# Patient Record
Sex: FEMALE | Race: BLACK OR AFRICAN AMERICAN | NOT HISPANIC OR LATINO | Employment: OTHER | URBAN - METROPOLITAN AREA
[De-identification: names, ages, dates, MRNs, and addresses within clinical notes are randomized per-mention and may not be internally consistent; named-entity substitution may affect disease eponyms.]

---

## 2023-12-05 ENCOUNTER — OCCUPATIONAL HEALTH (OUTPATIENT)
Dept: URGENT CARE | Facility: CLINIC | Age: 28
End: 2023-12-05

## 2023-12-05 VITALS — BODY MASS INDEX: 44.04 KG/M2 | WEIGHT: 280.63 LBS | HEIGHT: 67 IN

## 2023-12-05 DIAGNOSIS — Z00.00 ENCOUNTER FOR PHYSICAL EXAMINATION: Primary | ICD-10-CM

## 2023-12-05 LAB
BREATH ALCOHOL: 0
CTP QC/QA: YES
GLUCOSE SERPL-MCNC: 224 MG/DL (ref 70–110)
POC 10 PANEL DRUG SCREEN: NEGATIVE

## 2023-12-06 LAB
ALBUMIN SERPL-MCNC: 4.4 G/DL (ref 4–5)
ALBUMIN/GLOB SERPL: 1.2 {RATIO} (ref 1.2–2.2)
ALP SERPL-CCNC: 101 IU/L (ref 44–121)
ALT SERPL-CCNC: 23 IU/L (ref 0–32)
APPEARANCE UR: CLEAR
AST SERPL-CCNC: 16 IU/L (ref 0–40)
BACTERIA #/AREA URNS HPF: ABNORMAL /[HPF]
BASOPHILS # BLD AUTO: 0 X10E3/UL (ref 0–0.2)
BASOPHILS NFR BLD AUTO: 1 %
BILIRUB SERPL-MCNC: 0.3 MG/DL (ref 0–1.2)
BILIRUB UR QL STRIP: NEGATIVE
BUN SERPL-MCNC: 11 MG/DL (ref 6–20)
BUN/CREAT SERPL: 15 (ref 9–23)
CALCIUM SERPL-MCNC: 9.8 MG/DL (ref 8.7–10.2)
CASTS URNS QL MICRO: ABNORMAL /LPF
CHLORIDE SERPL-SCNC: 97 MMOL/L (ref 96–106)
CHOLEST SERPL-MCNC: 233 MG/DL (ref 100–199)
CO2 SERPL-SCNC: 25 MMOL/L (ref 20–29)
COLOR UR: YELLOW
CREAT SERPL-MCNC: 0.74 MG/DL (ref 0.57–1)
EOSINOPHIL # BLD AUTO: 0.3 X10E3/UL (ref 0–0.4)
EOSINOPHIL NFR BLD AUTO: 4 %
EPI CELLS #/AREA URNS HPF: >10 /HPF (ref 0–10)
ERYTHROCYTE [DISTWIDTH] IN BLOOD BY AUTOMATED COUNT: 14.3 % (ref 11.7–15.4)
EST. GFR  (NO RACE VARIABLE): 113 ML/MIN/1.73
GGT SERPL-CCNC: 11 IU/L (ref 0–60)
GLOBULIN SER CALC-MCNC: 3.8 G/DL (ref 1.5–4.5)
GLUCOSE SERPL-MCNC: 231 MG/DL (ref 70–99)
GLUCOSE UR QL STRIP: ABNORMAL
HCT VFR BLD AUTO: 39.3 % (ref 34–46.6)
HDLC SERPL-MCNC: 37 MG/DL
HGB BLD-MCNC: 12.1 G/DL (ref 11.1–15.9)
HGB UR QL STRIP: NEGATIVE
HIV 1+2 AB+HIV1 P24 AG SERPL QL IA: NON REACTIVE
IMM GRANULOCYTES # BLD AUTO: 0 X10E3/UL (ref 0–0.1)
IMM GRANULOCYTES NFR BLD AUTO: 0 %
KETONES UR QL STRIP: ABNORMAL
LDLC SERPL CALC-MCNC: 178 MG/DL (ref 0–99)
LEUKOCYTE ESTERASE UR QL STRIP: NEGATIVE
LYMPHOCYTES # BLD AUTO: 2.4 X10E3/UL (ref 0.7–3.1)
LYMPHOCYTES NFR BLD AUTO: 37 %
MCH RBC QN AUTO: 24.7 PG (ref 26.6–33)
MCHC RBC AUTO-ENTMCNC: 30.8 G/DL (ref 31.5–35.7)
MCV RBC AUTO: 80 FL (ref 79–97)
MICRO URNS: ABNORMAL
MONOCYTES # BLD AUTO: 0.4 X10E3/UL (ref 0.1–0.9)
MONOCYTES NFR BLD AUTO: 6 %
NEUTROPHILS # BLD AUTO: 3.4 X10E3/UL (ref 1.4–7)
NEUTROPHILS NFR BLD AUTO: 52 %
NITRITE UR QL STRIP: NEGATIVE
PH UR STRIP: 6.5 [PH] (ref 5–7.5)
PLATELET # BLD AUTO: 360 X10E3/UL (ref 150–450)
POTASSIUM SERPL-SCNC: 4.6 MMOL/L (ref 3.5–5.2)
PROT SERPL-MCNC: 8.2 G/DL (ref 6–8.5)
PROT UR QL STRIP: ABNORMAL
RBC # BLD AUTO: 4.89 X10E6/UL (ref 3.77–5.28)
RBC #/AREA URNS HPF: ABNORMAL /HPF (ref 0–2)
RPR SER QL: NON REACTIVE
SODIUM SERPL-SCNC: 134 MMOL/L (ref 134–144)
SP GR UR STRIP: >=1.03 (ref 1–1.03)
TRIGL SERPL-MCNC: 102 MG/DL (ref 0–149)
UROBILINOGEN UR STRIP-MCNC: 1 MG/DL (ref 0.2–1)
VLDLC SERPL CALC-MCNC: 18 MG/DL (ref 5–40)
WBC # BLD AUTO: 6.6 X10E3/UL (ref 3.4–10.8)
WBC #/AREA URNS HPF: ABNORMAL /HPF (ref 0–5)

## 2024-07-09 ENCOUNTER — OFFICE VISIT (OUTPATIENT)
Dept: OBSTETRICS AND GYNECOLOGY | Facility: CLINIC | Age: 29
End: 2024-07-09
Payer: COMMERCIAL

## 2024-07-09 VITALS
SYSTOLIC BLOOD PRESSURE: 122 MMHG | HEIGHT: 67 IN | BODY MASS INDEX: 42.46 KG/M2 | WEIGHT: 270.5 LBS | DIASTOLIC BLOOD PRESSURE: 74 MMHG

## 2024-07-09 DIAGNOSIS — Z12.4 SCREENING FOR CERVICAL CANCER: Primary | ICD-10-CM

## 2024-07-09 DIAGNOSIS — L73.2 HIDRADENITIS SUPPURATIVA: ICD-10-CM

## 2024-07-09 DIAGNOSIS — Z11.3 SCREENING EXAMINATION FOR STI: ICD-10-CM

## 2024-07-09 PROCEDURE — 87591 N.GONORRHOEAE DNA AMP PROB: CPT | Performed by: STUDENT IN AN ORGANIZED HEALTH CARE EDUCATION/TRAINING PROGRAM

## 2024-07-09 PROCEDURE — 88175 CYTOPATH C/V AUTO FLUID REDO: CPT | Performed by: STUDENT IN AN ORGANIZED HEALTH CARE EDUCATION/TRAINING PROGRAM

## 2024-07-09 PROCEDURE — 99999 PR PBB SHADOW E&M-EST. PATIENT-LVL III: CPT | Mod: PBBFAC,,, | Performed by: STUDENT IN AN ORGANIZED HEALTH CARE EDUCATION/TRAINING PROGRAM

## 2024-07-09 PROCEDURE — 87491 CHLMYD TRACH DNA AMP PROBE: CPT | Performed by: STUDENT IN AN ORGANIZED HEALTH CARE EDUCATION/TRAINING PROGRAM

## 2024-07-09 RX ORDER — CLINDAMYCIN HYDROCHLORIDE 300 MG/1
300 CAPSULE ORAL EVERY 6 HOURS
Qty: 56 CAPSULE | Refills: 0 | Status: SHIPPED | OUTPATIENT
Start: 2024-07-09 | End: 2024-07-23

## 2024-07-09 RX ORDER — METFORMIN HYDROCHLORIDE 500 MG/1
500 TABLET ORAL 2 TIMES DAILY WITH MEALS
COMMUNITY

## 2024-07-09 NOTE — PROGRESS NOTES
History & Physical  Gynecology      SUBJECTIVE:     Chief Complaint: Establish Care and Well Woman       History of Present Illness:    Here today for annual. Doing well.     Gyn: regular light periods, not currently sexually active but when is uses condoms. No hx STI. No hx of abnormal pap. No immediate FH of gyn or colon cancer    No tobacco    Going to [facility] this summer.    Has questions about boils.   Review of patient's allergies indicates:  No Known Allergies    Past Medical History:   Diagnosis Date    Factor VII deficiency      Past Surgical History:   Procedure Laterality Date    HIP SURGERY Left     KNEE SURGERY Left     MOUTH SURGERY Bilateral     MOUTH SURGERY      TONSILLECTOMY AND ADENOIDECTOMY       OB History          0    Para   0    Term   0       0    AB   0    Living   0         SAB   0    IAB   0    Ectopic   0    Multiple   0    Live Births   0               Family History   Problem Relation Name Age of Onset    Breast cancer Neg Hx      Colon cancer Neg Hx      Ovarian cancer Neg Hx      Uterine cancer Neg Hx      Cervical cancer Neg Hx       Social History     Tobacco Use    Smoking status: Never    Smokeless tobacco: Never   Substance Use Topics    Alcohol use: Yes     Comment: occ/rarely    Drug use: Never       Current Outpatient Medications   Medication Sig    metFORMIN (GLUCOPHAGE) 500 MG tablet Take 500 mg by mouth 2 (two) times daily with meals.    clindamycin (CLEOCIN) 300 MG capsule Take 1 capsule (300 mg total) by mouth every 6 (six) hours. for 14 days    coagulation factor VIIa,recomb (NOVOSEVEN IV) Inject into the vein.     No current facility-administered medications for this visit.         Review of Systems:  Review of Systems   Constitutional:  Negative for chills, fatigue and fever.   HENT:  Negative for congestion.    Eyes:  Negative for visual disturbance.   Respiratory:  Negative for cough and shortness of breath.    Cardiovascular:  Negative for chest  pain and palpitations.   Gastrointestinal:  Negative for abdominal distention, abdominal pain, constipation, diarrhea, nausea and vomiting.   Genitourinary:  Negative for difficulty urinating, dysuria, hematuria, vaginal bleeding and vaginal discharge.   Skin:  Negative for rash.   Neurological:  Negative for dizziness, seizures, light-headedness and headaches.   Hematological:  Does not bruise/bleed easily.   Psychiatric/Behavioral:  Negative for dysphoric mood. The patient is not nervous/anxious.         OBJECTIVE:     Physical Exam:  Physical Exam  Vitals reviewed.   Constitutional:       General: She is not in acute distress.     Appearance: Normal appearance. She is well-developed.   HENT:      Head: Normocephalic and atraumatic.   Cardiovascular:      Rate and Rhythm: Normal rate and regular rhythm.   Pulmonary:      Effort: Pulmonary effort is normal.   Chest:   Breasts:     Right: No inverted nipple, mass, nipple discharge, skin change or tenderness.      Left: No inverted nipple, mass, nipple discharge, skin change or tenderness.          Comments: Boils consistent with HS  Abdominal:      General: There is no distension.      Palpations: Abdomen is soft.      Tenderness: There is no abdominal tenderness.   Genitourinary:     Vagina: Normal.          Comments: Normal external female genitalia, normal hair distribution. Vaginal mucosa pink, moist, well rugated, scant white physiologic discharge. No blood in vault. Cervix pink, non-friable, without lesion. No CMT. Uterus non tender, mobile, not enlarged. Adnexa without fullness or tenderness.      ++ pelvic floor tension     ++ boils consistent with HS  Skin:     General: Skin is warm.   Neurological:      Mental Status: She is alert and oriented to person, place, and time.   Psychiatric:         Behavior: Behavior normal.         Thought Content: Thought content normal.         Judgment: Judgment normal.           ASSESSMENT:       ICD-10-CM ICD-9-CM    1.  Screening for cervical cancer  Z12.4 V76.2 Liquid-Based Pap Smear, Screening      2. Screening examination for STI  Z11.3 V74.5       3. Hidradenitis suppurativa  L73.2 705.83 clindamycin (CLEOCIN) 300 MG capsule      Ambulatory referral/consult to Dermatology          Orders Placed This Encounter   Procedures    Ambulatory referral/consult to Dermatology     Standing Status:   Future     Standing Expiration Date:   8/9/2025     Referral Priority:   Routine     Referral Type:   Consultation     Referral Reason:   Specialty Services Required     Requested Specialty:   Dermatology     Number of Visits Requested:   1           Plan:      Well woman:  - Pap today   - tobacco cessation n/a  - contraception declines  - HPV vaccine s/p  - Safe Sex screening GC/CT  - Obesity: Patient was counseled today regarding her diagnosis of obesity. We discussed her BMI and that normal BMI should be less than 25. We discussed the role obesity plays in her overall health, including increased risk for or worsening of diabetes, hypertension and cardiovascular disease. I recommend starting with a goal of losing 5% of her body weight. We discussed strategies for weight loss including appropriate portion sizes of healthier food choices such as grilled or baked protein sources, increasing fruits and vegetables while decreasing carbohydrate intake. Also discussed increasing physical activity in daily routine, such as taking stairs instead of elevators, parking at the back of the parking lot and walking into stores, and exercise when able.  Informational handouts on diet, exercise and obesity were given to the patient.   - Counseled to take daily multivitamin. If patient is of reproductive age and not on contraception, to take prenatal vitamin. Patient has been counseled on the vitamin D and calcium requirements per ACOG recommendations.  Age   Calcium(mg/day)   Vitamin D (IU/day)  9-18    1300                       600  19-50  1000                        600  51-70  1200                       600  >70      1,200                      800  Patient to start daily vitamin   - Covid vaccine n/a  - Discussed IPV, feels safe     CC: Boils  HPI: has always had boils under arm pits and groin. + pain, + drain.  A/P:  - consistent with HS  - cleocin called in  - lifestyle management including weight loss, improving possible insulin resistance  - referral to dermatology    Janeth Gregory M.D.  Obstetrics and Gynecology

## 2024-07-11 LAB
CLINICAL INFO: NORMAL
DATE OF PREVIOUS PAP: NORMAL
DATE PREVIOUS BX: NO
LMP START DATE: NORMAL
SPECIMEN SOURCE CVX/VAG CYTO: NORMAL

## 2024-07-13 LAB
C TRACH DNA SPEC QL NAA+PROBE: NOT DETECTED
N GONORRHOEA DNA SPEC QL NAA+PROBE: NOT DETECTED

## 2024-07-22 ENCOUNTER — OFFICE VISIT (OUTPATIENT)
Dept: FAMILY MEDICINE | Facility: CLINIC | Age: 29
End: 2024-07-22
Payer: COMMERCIAL

## 2024-07-22 ENCOUNTER — TELEPHONE (OUTPATIENT)
Dept: HEMATOLOGY/ONCOLOGY | Facility: CLINIC | Age: 29
End: 2024-07-22
Payer: COMMERCIAL

## 2024-07-22 VITALS
WEIGHT: 274.25 LBS | BODY MASS INDEX: 43.04 KG/M2 | DIASTOLIC BLOOD PRESSURE: 82 MMHG | HEART RATE: 92 BPM | RESPIRATION RATE: 18 BRPM | SYSTOLIC BLOOD PRESSURE: 134 MMHG | HEIGHT: 67 IN

## 2024-07-22 DIAGNOSIS — D68.2 FACTOR VII DEFICIENCY: ICD-10-CM

## 2024-07-22 DIAGNOSIS — E11.69 DM TYPE 2 WITH DIABETIC MIXED HYPERLIPIDEMIA: ICD-10-CM

## 2024-07-22 DIAGNOSIS — Z11.59 NEED FOR HEPATITIS C SCREENING TEST: ICD-10-CM

## 2024-07-22 DIAGNOSIS — R79.89 ABNORMAL CBC: ICD-10-CM

## 2024-07-22 DIAGNOSIS — E78.2 DM TYPE 2 WITH DIABETIC MIXED HYPERLIPIDEMIA: ICD-10-CM

## 2024-07-22 DIAGNOSIS — E66.01 MORBID OBESITY WITH BMI OF 40.0-44.9, ADULT: ICD-10-CM

## 2024-07-22 DIAGNOSIS — R53.83 FATIGUE, UNSPECIFIED TYPE: ICD-10-CM

## 2024-07-22 DIAGNOSIS — Z00.00 WELLNESS EXAMINATION: Primary | ICD-10-CM

## 2024-07-22 PROBLEM — R73.09 ELEVATED GLUCOSE LEVEL: Status: ACTIVE | Noted: 2023-12-11

## 2024-07-22 PROBLEM — N92.0 MENORRHAGIA: Status: ACTIVE | Noted: 2018-05-17

## 2024-07-22 PROCEDURE — 3075F SYST BP GE 130 - 139MM HG: CPT | Mod: CPTII,S$GLB,, | Performed by: INTERNAL MEDICINE

## 2024-07-22 PROCEDURE — 1159F MED LIST DOCD IN RCRD: CPT | Mod: CPTII,S$GLB,, | Performed by: INTERNAL MEDICINE

## 2024-07-22 PROCEDURE — 99385 PREV VISIT NEW AGE 18-39: CPT | Mod: S$GLB,,, | Performed by: INTERNAL MEDICINE

## 2024-07-22 PROCEDURE — 1160F RVW MEDS BY RX/DR IN RCRD: CPT | Mod: CPTII,S$GLB,, | Performed by: INTERNAL MEDICINE

## 2024-07-22 PROCEDURE — 3008F BODY MASS INDEX DOCD: CPT | Mod: CPTII,S$GLB,, | Performed by: INTERNAL MEDICINE

## 2024-07-22 PROCEDURE — 99999 PR PBB SHADOW E&M-EST. PATIENT-LVL IV: CPT | Mod: PBBFAC,,, | Performed by: INTERNAL MEDICINE

## 2024-07-22 PROCEDURE — 3079F DIAST BP 80-89 MM HG: CPT | Mod: CPTII,S$GLB,, | Performed by: INTERNAL MEDICINE

## 2024-07-22 RX ORDER — METFORMIN HYDROCHLORIDE 500 MG/1
500 TABLET, EXTENDED RELEASE ORAL
Qty: 30 TABLET | Refills: 2 | Status: SHIPPED | OUTPATIENT
Start: 2024-07-22 | End: 2025-07-22

## 2024-07-22 NOTE — PROGRESS NOTES
Subjective:       Patient ID: Lenka Barboza is a 28 y.o. female.    Chief Complaint: Establish Care   HPI    Gyn: Dr Foreman   Immunizations: Flu: Tdap: booster recommend.  Prevnar 20: next visit   Smoker: never     New to clinic  Establish care  Moved back 1 yr [place]  Works at 's office    Wellness   Due labs     Type 2 diabetes:  Diagnosed early 20's.  Not compliant with metformin due to excessive diarrhea with use.  Bottle Rx once daily.  Last time had labs done [facility].  Not sure previous A1c.  Occasional check sugars not feeling well will be in the 200.  Drink sweet tea working on cutting down.   Wants to see of Ozempic is covered.  Rx not consistent with taking Metformin 500 once daily (SE diarreha)   A1c 2018 6.6     Factor 7 deficiency:  Previous severe menorrhagia.  Manage OCP.   Mgmt  needs new Heme MD.  Avoids NSAIDs.  Developed allergy to Tylenol.    Previously aged out [facility].Eren Villanueva MD.   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  Plan:  1. She should continue her oral contraceptives  2. If her heavy menses continues with her OCPs, we will plan to add Lysteda during her cycle  3. For moderate to major bleeding episodes she will be treated with antifibrinolytics and rFVIIa @ 20-30mcg/kg/dose  4. She has iron deficiency without anemia and I will recommend a daily MVI with iron    Mixed HLD: LDL goal < 70 // not on statin. Get new labs    LDL highest 178      Metabolic syndrome: BMI 43/274.  Refer to diabetic education    ____________________________________________________________________________________________________  Assessment & Plan:  1. Wellness examination  - Comprehensive Metabolic Panel; Future  - Hemoglobin A1C; Future  - Lipid Panel; Future  - Microalbumin/Creatinine Ratio, Urine; Future  - Iron and TIBC; Future  - CBC Without  Differential; Future  - TSH; Future  - Hepatitis C Antibody; Future    2. Factor VII deficiency  - Ambulatory referral/consult to Hematology / Oncology; Future    3. DM type 2 with diabetic mixed hyperlipidemia  - Comprehensive Metabolic Panel; Future  - Hemoglobin A1C; Future  - Lipid Panel; Future  - Microalbumin/Creatinine Ratio, Urine; Future  - metFORMIN (GLUCOPHAGE-XR) 500 MG ER 24hr tablet; Take 1 tablet (500 mg total) by mouth daily with breakfast.  Dispense: 30 tablet; Refill: 2    4. Fatigue, unspecified type  - TSH; Future    5. Abnormal CBC  - Iron and TIBC; Future  - CBC Without Differential; Future    6. Need for hepatitis C screening test  - Hepatitis C Antibody; Future    7. Morbid obesity with BMI of 40.0-44.9, adult     Wellness examination  -     Comprehensive Metabolic Panel; Future; Expected date: 07/22/2024  -     Hemoglobin A1C; Future; Expected date: 07/22/2024  -     Lipid Panel; Future; Expected date: 07/22/2024  -     Microalbumin/Creatinine Ratio, Urine; Future; Expected date: 07/22/2024  -     Iron and TIBC; Future; Expected date: 07/22/2024  -     CBC Without Differential; Future; Expected date: 07/22/2024  -     TSH; Future; Expected date: 07/22/2024  -     Hepatitis C Antibody; Future; Expected date: 07/22/2024    Factor VII deficiency  -     Ambulatory referral/consult to Hematology / Oncology; Future; Expected date: 07/29/2024    DM type 2 with diabetic mixed hyperlipidemia  -     Comprehensive Metabolic Panel; Future; Expected date: 07/22/2024  -     Hemoglobin A1C; Future; Expected date: 07/22/2024  -     Lipid Panel; Future; Expected date: 07/22/2024  -     Microalbumin/Creatinine Ratio, Urine; Future; Expected date: 07/22/2024  -     metFORMIN (GLUCOPHAGE-XR) 500 MG ER 24hr tablet; Take 1 tablet (500 mg total) by mouth daily with breakfast.  Dispense: 30 tablet; Refill: 2    Fatigue, unspecified type  -     TSH; Future; Expected date: 07/22/2024    Abnormal CBC  -     Iron and  "TIBC; Future; Expected date: 07/22/2024  -     CBC Without Differential; Future; Expected date: 07/22/2024    Need for hepatitis C screening test  -     Hepatitis C Antibody; Future; Expected date: 07/22/2024    Morbid obesity with BMI of 40.0-44.9, adult        Continue to work on regular exercise, maintain healthy weight, balanced diet. Avoid unhealthy habits: smoking, excessive alcohol intake.     Disclaimer: This note was partly generated using dictation software which may occasionally result in transcription errors  ____________________________________________________________________________________________________  Review of Systems:  Review of Systems   Constitutional:  Negative for appetite change.   HENT:  Negative for nosebleeds.    Eyes:  Negative for pain.   Respiratory:  Negative for choking.    Cardiovascular:  Negative for chest pain.   Gastrointestinal:  Negative for blood in stool.   Genitourinary:  Negative for hematuria.   Musculoskeletal:  Negative for joint swelling.   Skin:  Negative for pallor.   Neurological:  Negative for facial asymmetry.   Hematological:  Does not bruise/bleed easily.   Psychiatric/Behavioral:  Negative for confusion.        Objective:     Wt Readings from Last 3 Encounters:   07/22/24 124.4 kg (274 lb 4 oz)   07/09/24 122.7 kg (270 lb 8.1 oz)   12/05/23 127.3 kg (280 lb 10.3 oz)     BP Readings from Last 3 Encounters:   07/22/24 134/82   07/09/24 122/74       Lab Results   Component Value Date    WBC 6.6 12/05/2023    HGB 12.1 12/05/2023    HCT 39.3 12/05/2023     12/05/2023     12/05/2023    K 4.6 12/05/2023    CL 97 12/05/2023    ALT 23 12/05/2023    AST 16 12/05/2023    CO2 25 12/05/2023    CREATININE 0.74 12/05/2023    BUN 11 12/05/2023     (H) 12/05/2023      No results found for: "HGBA1C"   No results found for: "TSH"  No results found for: "FREET4"  Lab Results   Component Value Date    LDLCALC 178 (H) 12/05/2023     Lab Results   Component " Value Date    TRIG 102 12/05/2023            Physical Exam  Constitutional:       Appearance: Normal appearance.   HENT:      Head: Normocephalic and atraumatic.   Eyes:      Extraocular Movements: Extraocular movements intact.      Pupils: Pupils are equal, round, and reactive to light.   Cardiovascular:      Rate and Rhythm: Normal rate.   Pulmonary:      Effort: Pulmonary effort is normal.   Neurological:      Mental Status: She is alert and oriented to person, place, and time.   Psychiatric:         Mood and Affect: Mood normal.         Medication List with Changes/Refills   New Medications    METFORMIN (GLUCOPHAGE-XR) 500 MG ER 24HR TABLET    Take 1 tablet (500 mg total) by mouth daily with breakfast.   Current Medications    CLINDAMYCIN (CLEOCIN) 300 MG CAPSULE    Take 1 capsule (300 mg total) by mouth every 6 (six) hours. for 14 days    COAGULATION FACTOR VIIA,RECOMB (NOVOSEVEN IV)    Inject into the vein.   Discontinued Medications    METFORMIN (GLUCOPHAGE) 500 MG TABLET    Take 500 mg by mouth 2 (two) times daily with meals.

## 2024-07-22 NOTE — TELEPHONE ENCOUNTER
Hematology referral in [place]. Spoke with pt and scheduled first available. Pt accepted appt and VU of date/time/location.

## 2024-08-05 ENCOUNTER — LAB VISIT (OUTPATIENT)
Dept: LAB | Facility: HOSPITAL | Age: 29
End: 2024-08-05
Attending: INTERNAL MEDICINE
Payer: COMMERCIAL

## 2024-08-05 DIAGNOSIS — E78.2 DM TYPE 2 WITH DIABETIC MIXED HYPERLIPIDEMIA: ICD-10-CM

## 2024-08-05 DIAGNOSIS — Z00.00 WELLNESS EXAMINATION: ICD-10-CM

## 2024-08-05 DIAGNOSIS — R53.83 FATIGUE, UNSPECIFIED TYPE: ICD-10-CM

## 2024-08-05 DIAGNOSIS — Z11.59 NEED FOR HEPATITIS C SCREENING TEST: ICD-10-CM

## 2024-08-05 DIAGNOSIS — E11.69 DM TYPE 2 WITH DIABETIC MIXED HYPERLIPIDEMIA: ICD-10-CM

## 2024-08-05 DIAGNOSIS — R79.89 ABNORMAL CBC: ICD-10-CM

## 2024-08-05 LAB
ALBUMIN SERPL BCP-MCNC: 3.8 G/DL (ref 3.5–5.2)
ALBUMIN/CREAT UR: NORMAL UG/MG (ref 0–30)
ALP SERPL-CCNC: 80 U/L (ref 55–135)
ALT SERPL W/O P-5'-P-CCNC: 16 U/L (ref 10–44)
ANION GAP SERPL CALC-SCNC: 6 MMOL/L (ref 8–16)
AST SERPL-CCNC: 10 U/L (ref 10–40)
BILIRUB SERPL-MCNC: 0.3 MG/DL (ref 0.1–1)
BUN SERPL-MCNC: 14 MG/DL (ref 6–20)
CALCIUM SERPL-MCNC: 10 MG/DL (ref 8.7–10.5)
CHLORIDE SERPL-SCNC: 102 MMOL/L (ref 95–110)
CHOLEST SERPL-MCNC: 225 MG/DL (ref 120–199)
CHOLEST/HDLC SERPL: 6.3 {RATIO} (ref 2–5)
CO2 SERPL-SCNC: 26 MMOL/L (ref 23–29)
CREAT SERPL-MCNC: 0.8 MG/DL (ref 0.5–1.4)
CREAT UR-MCNC: 45 MG/DL (ref 15–325)
ERYTHROCYTE [DISTWIDTH] IN BLOOD BY AUTOMATED COUNT: 14.9 % (ref 11.5–14.5)
EST. GFR  (NO RACE VARIABLE): >60 ML/MIN/1.73 M^2
ESTIMATED AVG GLUCOSE: 255 MG/DL (ref 68–131)
GLUCOSE SERPL-MCNC: 297 MG/DL (ref 70–110)
HBA1C MFR BLD: 10.5 % (ref 4–5.6)
HCT VFR BLD AUTO: 40.7 % (ref 37–48.5)
HCV AB SERPL QL IA: NORMAL
HDLC SERPL-MCNC: 36 MG/DL (ref 40–75)
HDLC SERPL: 16 % (ref 20–50)
HGB BLD-MCNC: 12.1 G/DL (ref 12–16)
IRON SERPL-MCNC: 40 UG/DL (ref 30–160)
LDLC SERPL CALC-MCNC: 164.6 MG/DL (ref 63–159)
MCH RBC QN AUTO: 23.8 PG (ref 27–31)
MCHC RBC AUTO-ENTMCNC: 29.7 G/DL (ref 32–36)
MCV RBC AUTO: 80 FL (ref 82–98)
MICROALBUMIN UR DL<=1MG/L-MCNC: <5 UG/ML
NONHDLC SERPL-MCNC: 189 MG/DL
PLATELET # BLD AUTO: 330 K/UL (ref 150–450)
PMV BLD AUTO: 11.3 FL (ref 9.2–12.9)
POTASSIUM SERPL-SCNC: 4.7 MMOL/L (ref 3.5–5.1)
PROT SERPL-MCNC: 7.8 G/DL (ref 6–8.4)
RBC # BLD AUTO: 5.08 M/UL (ref 4–5.4)
SATURATED IRON: 14 % (ref 20–50)
SODIUM SERPL-SCNC: 134 MMOL/L (ref 136–145)
TOTAL IRON BINDING CAPACITY: 283 UG/DL (ref 250–450)
TRANSFERRIN SERPL-MCNC: 191 MG/DL (ref 200–375)
TRIGL SERPL-MCNC: 122 MG/DL (ref 30–150)
TSH SERPL DL<=0.005 MIU/L-ACNC: 1.82 UIU/ML (ref 0.4–4)
WBC # BLD AUTO: 7.12 K/UL (ref 3.9–12.7)

## 2024-08-05 PROCEDURE — 84443 ASSAY THYROID STIM HORMONE: CPT | Performed by: INTERNAL MEDICINE

## 2024-08-05 PROCEDURE — 82570 ASSAY OF URINE CREATININE: CPT | Performed by: INTERNAL MEDICINE

## 2024-08-05 PROCEDURE — 84466 ASSAY OF TRANSFERRIN: CPT | Performed by: INTERNAL MEDICINE

## 2024-08-05 PROCEDURE — 80053 COMPREHEN METABOLIC PANEL: CPT | Performed by: INTERNAL MEDICINE

## 2024-08-05 PROCEDURE — 83540 ASSAY OF IRON: CPT | Performed by: INTERNAL MEDICINE

## 2024-08-05 PROCEDURE — 85027 COMPLETE CBC AUTOMATED: CPT | Performed by: INTERNAL MEDICINE

## 2024-08-05 PROCEDURE — 80061 LIPID PANEL: CPT | Performed by: INTERNAL MEDICINE

## 2024-08-05 PROCEDURE — 86803 HEPATITIS C AB TEST: CPT | Performed by: INTERNAL MEDICINE

## 2024-08-05 PROCEDURE — 36415 COLL VENOUS BLD VENIPUNCTURE: CPT | Mod: PO | Performed by: INTERNAL MEDICINE

## 2024-08-05 PROCEDURE — 83036 HEMOGLOBIN GLYCOSYLATED A1C: CPT | Performed by: INTERNAL MEDICINE

## 2024-08-06 DIAGNOSIS — Z00.00 WELLNESS EXAMINATION: ICD-10-CM

## 2024-08-06 DIAGNOSIS — E11.69 DM TYPE 2 WITH DIABETIC MIXED HYPERLIPIDEMIA: Primary | ICD-10-CM

## 2024-08-06 DIAGNOSIS — E78.2 DM TYPE 2 WITH DIABETIC MIXED HYPERLIPIDEMIA: Primary | ICD-10-CM

## 2024-08-06 RX ORDER — ROSUVASTATIN CALCIUM 5 MG/1
5 TABLET, COATED ORAL DAILY
Qty: 90 TABLET | Refills: 1 | Status: SHIPPED | OUTPATIENT
Start: 2024-08-06 | End: 2025-08-06

## 2024-08-06 RX ORDER — SEMAGLUTIDE 0.68 MG/ML
INJECTION, SOLUTION SUBCUTANEOUS
Qty: 3 ML | Refills: 3 | Status: SHIPPED | OUTPATIENT
Start: 2024-08-06

## 2024-09-10 ENCOUNTER — TELEPHONE (OUTPATIENT)
Dept: HEMATOLOGY/ONCOLOGY | Facility: CLINIC | Age: 29
End: 2024-09-10
Payer: COMMERCIAL

## 2024-09-11 DIAGNOSIS — E78.2 DM TYPE 2 WITH DIABETIC MIXED HYPERLIPIDEMIA: ICD-10-CM

## 2024-09-11 DIAGNOSIS — E11.69 DM TYPE 2 WITH DIABETIC MIXED HYPERLIPIDEMIA: ICD-10-CM

## 2024-09-12 RX ORDER — SEMAGLUTIDE 0.68 MG/ML
0.5 INJECTION, SOLUTION SUBCUTANEOUS
Qty: 3 ML | Refills: 1 | Status: SHIPPED | OUTPATIENT
Start: 2024-09-12

## 2024-09-12 NOTE — TELEPHONE ENCOUNTER
Refill Routing Note   Medication(s) are not appropriate for processing by [facility] for the following reason(s):        New or recently adjusted medication    [facility] action(s):  Defer             Appointments  past 12m or future 3m with PCP    Date Provider   Last Visit   7/22/2024 Du Roldan MD   Next Visit   11/21/2024 Du Roldan MD   ED visits in past 90 days: 0        Note composed:11:44 AM 09/12/2024

## 2024-09-30 ENCOUNTER — LAB VISIT (OUTPATIENT)
Dept: LAB | Facility: HOSPITAL | Age: 29
End: 2024-09-30
Attending: INTERNAL MEDICINE
Payer: COMMERCIAL

## 2024-09-30 ENCOUNTER — OFFICE VISIT (OUTPATIENT)
Dept: HEMATOLOGY/ONCOLOGY | Facility: CLINIC | Age: 29
End: 2024-09-30
Payer: COMMERCIAL

## 2024-09-30 VITALS
BODY MASS INDEX: 41.42 KG/M2 | DIASTOLIC BLOOD PRESSURE: 95 MMHG | WEIGHT: 263.88 LBS | SYSTOLIC BLOOD PRESSURE: 153 MMHG | HEIGHT: 67 IN | OXYGEN SATURATION: 97 % | RESPIRATION RATE: 16 BRPM | TEMPERATURE: 98 F | HEART RATE: 100 BPM

## 2024-09-30 DIAGNOSIS — D68.2 FACTOR VII DEFICIENCY: ICD-10-CM

## 2024-09-30 DIAGNOSIS — N92.0 MENORRHAGIA WITH REGULAR CYCLE: Primary | ICD-10-CM

## 2024-09-30 LAB
ALBUMIN SERPL BCP-MCNC: 3.8 G/DL (ref 3.5–5.2)
ALP SERPL-CCNC: 85 U/L (ref 55–135)
ALT SERPL W/O P-5'-P-CCNC: 10 U/L (ref 10–44)
ANION GAP SERPL CALC-SCNC: 9 MMOL/L (ref 8–16)
APTT PPP: 36.4 SEC (ref 21–32)
AST SERPL-CCNC: 8 U/L (ref 10–40)
BASOPHILS # BLD AUTO: 0.01 K/UL (ref 0–0.2)
BASOPHILS NFR BLD: 0.2 % (ref 0–1.9)
BILIRUB SERPL-MCNC: 0.4 MG/DL (ref 0.1–1)
BUN SERPL-MCNC: 9 MG/DL (ref 6–20)
CALCIUM SERPL-MCNC: 9.7 MG/DL (ref 8.7–10.5)
CHLORIDE SERPL-SCNC: 99 MMOL/L (ref 95–110)
CO2 SERPL-SCNC: 25 MMOL/L (ref 23–29)
CREAT SERPL-MCNC: 0.9 MG/DL (ref 0.5–1.4)
DIFFERENTIAL METHOD BLD: ABNORMAL
EOSINOPHIL # BLD AUTO: 0.2 K/UL (ref 0–0.5)
EOSINOPHIL NFR BLD: 3.3 % (ref 0–8)
ERYTHROCYTE [DISTWIDTH] IN BLOOD BY AUTOMATED COUNT: 15.2 % (ref 11.5–14.5)
EST. GFR  (NO RACE VARIABLE): >60 ML/MIN/1.73 M^2
FIBRINOGEN PPP-MCNC: 325 MG/DL (ref 182–400)
GLUCOSE SERPL-MCNC: 350 MG/DL (ref 70–110)
HCT VFR BLD AUTO: 38.4 % (ref 37–48.5)
HGB BLD-MCNC: 12.1 G/DL (ref 12–16)
IMM GRANULOCYTES # BLD AUTO: 0.01 K/UL (ref 0–0.04)
IMM GRANULOCYTES NFR BLD AUTO: 0.2 % (ref 0–0.5)
INR PPP: 1.2 (ref 0.8–1.2)
LYMPHOCYTES # BLD AUTO: 1.6 K/UL (ref 1–4.8)
LYMPHOCYTES NFR BLD: 24.2 % (ref 18–48)
MCH RBC QN AUTO: 24.1 PG (ref 27–31)
MCHC RBC AUTO-ENTMCNC: 31.5 G/DL (ref 32–36)
MCV RBC AUTO: 77 FL (ref 82–98)
MONOCYTES # BLD AUTO: 0.4 K/UL (ref 0.3–1)
MONOCYTES NFR BLD: 6 % (ref 4–15)
NEUTROPHILS # BLD AUTO: 4.4 K/UL (ref 1.8–7.7)
NEUTROPHILS NFR BLD: 66.1 % (ref 38–73)
NRBC BLD-RTO: 0 /100 WBC
PLATELET # BLD AUTO: 317 K/UL (ref 150–450)
PMV BLD AUTO: 8.9 FL (ref 9.2–12.9)
POTASSIUM SERPL-SCNC: 4.2 MMOL/L (ref 3.5–5.1)
PROT SERPL-MCNC: 8.1 G/DL (ref 6–8.4)
PROTHROMBIN TIME: 12.9 SEC (ref 9–12.5)
RBC # BLD AUTO: 5.02 M/UL (ref 4–5.4)
SODIUM SERPL-SCNC: 133 MMOL/L (ref 136–145)
WBC # BLD AUTO: 6.64 K/UL (ref 3.9–12.7)

## 2024-09-30 PROCEDURE — 85610 PROTHROMBIN TIME: CPT | Performed by: INTERNAL MEDICINE

## 2024-09-30 PROCEDURE — 99205 OFFICE O/P NEW HI 60 MIN: CPT | Mod: S$GLB,,, | Performed by: INTERNAL MEDICINE

## 2024-09-30 PROCEDURE — 3046F HEMOGLOBIN A1C LEVEL >9.0%: CPT | Mod: CPTII,S$GLB,, | Performed by: INTERNAL MEDICINE

## 2024-09-30 PROCEDURE — 3008F BODY MASS INDEX DOCD: CPT | Mod: CPTII,S$GLB,, | Performed by: INTERNAL MEDICINE

## 2024-09-30 PROCEDURE — 1159F MED LIST DOCD IN RCRD: CPT | Mod: CPTII,S$GLB,, | Performed by: INTERNAL MEDICINE

## 2024-09-30 PROCEDURE — 85384 FIBRINOGEN ACTIVITY: CPT | Performed by: INTERNAL MEDICINE

## 2024-09-30 PROCEDURE — 85025 COMPLETE CBC W/AUTO DIFF WBC: CPT | Mod: PN | Performed by: INTERNAL MEDICINE

## 2024-09-30 PROCEDURE — 82728 ASSAY OF FERRITIN: CPT | Performed by: INTERNAL MEDICINE

## 2024-09-30 PROCEDURE — 85730 THROMBOPLASTIN TIME PARTIAL: CPT | Performed by: INTERNAL MEDICINE

## 2024-09-30 PROCEDURE — G2211 COMPLEX E/M VISIT ADD ON: HCPCS | Mod: S$GLB,,, | Performed by: INTERNAL MEDICINE

## 2024-09-30 PROCEDURE — 3080F DIAST BP >= 90 MM HG: CPT | Mod: CPTII,S$GLB,, | Performed by: INTERNAL MEDICINE

## 2024-09-30 PROCEDURE — 85230 CLOT FACTOR VII PROCONVERTIN: CPT | Performed by: INTERNAL MEDICINE

## 2024-09-30 PROCEDURE — 3077F SYST BP >= 140 MM HG: CPT | Mod: CPTII,S$GLB,, | Performed by: INTERNAL MEDICINE

## 2024-09-30 PROCEDURE — 80053 COMPREHEN METABOLIC PANEL: CPT | Mod: PN | Performed by: INTERNAL MEDICINE

## 2024-09-30 PROCEDURE — 99999 PR PBB SHADOW E&M-EST. PATIENT-LVL IV: CPT | Mod: PBBFAC,,, | Performed by: INTERNAL MEDICINE

## 2024-09-30 PROCEDURE — 85670 THROMBIN TIME PLASMA: CPT | Performed by: INTERNAL MEDICINE

## 2024-09-30 NOTE — PROGRESS NOTES
Subjective     Patient ID: Lenka Barboza is a 29 y.o. female.    Chief Complaint: New Patient (Factor VII deficiency / Jamar)    HPI  Mrs. Barboza is a 29-year-old female referred for factor 7 deficiency.  Apparently she had been diagnosed at [facility] as a young child.  She was subsequently followed at the [facility] and the recommendations so far were to treat her with recombinant factor 7 for moderate to major bleeding episodes.  In addition, she was intermittently treated with oral contraceptives and she has had iron deficiency anemia treated intermittently in the past.  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  As mentioned above, her initial diagnosis was at [facility].  As a young adult she was followed at [facility] but subsequently moved to [place], [place] and she recently moved back to the [place] area in August of 2023.  She has not seen a hematologist since her return to [place].    Most recent CBC within our system is from 08/05/2024 and it is as follows:  WBCs 7100 per cubic mm, hemoglobin 12.1 grams/deciliter, hematocrit 40.7%, MCV 80 and platelets 330 K    PAST MEDICAL HISTORY:  As above.  She also has a history of menorrhagias, intermittent iron deficiency anemia, and type 2 diabetes  PAST SURGICAL HISTORY:  Significant for tonsillectomy, teeth extractions, surgery in the left hip and in the left knee.  SOCIAL HISTORY:  She is single.  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  She does not smoke and does not drink alcohol more than socially  FAMILY HISTORY:  Her father had also had nosebleeds.  GYN HISTORY:  Prior history of menorrhagia, she had been managed with OCPs intermittently but she is not on any right now.  She states that her periods are every 28-30 days with the  menstrual flow being 3-4 days at a time and the 1st couple of days being heavier.    Review of Systems     Overall she feels well and she has absolutely no complaints today other than mild nausea.  ECOG PS is 1. She denies any anxiety, depression, easy bruising, fevers, chills, night  sweats, weight loss, vomiting, diarrhea, constipation, diplopia, blurred vision, headache, chest pain, palpitations, shortness of breath, breast pain, abdominal pain, extremity pain, or difficulty ambulating.  The remainder of the ten-point ROS, including general, skin, lymph, H/N, cardiorespiratory, GI, , Neuro, Endocrine, and psychiatric is negative.       Physical Exam    She is alert, oriented to time, place, person, pleasant, well      nourished, in no acute physical distress.                                    VITAL SIGNS:  Reviewed                                      HEENT:  Normal.  There are no nasal, oral, lip, gingival, auricular, lid,    or conjunctival lesions.  Mucosae are moist and pink, and there is no        thrush.  Pupils are equal, reactive to light and accommodation.              Extraocular muscle movements are intact.  Dentition is good.  There is no frontal or maxillary tenderness.                                     NECK:  Supple without JVD, adenopathy, or thyromegaly.                       LUNGS:  Clear to auscultation without wheezing, rales, or rhonchi.           CARDIOVASCULAR:  Reveals an S1, S2, no murmurs, no rubs, no gallops.         ABDOMEN:  Soft, nontender, without organomegaly.  Bowel sounds are    present.                                                                     EXTREMITIES:  No cyanosis, clubbing, or edema.                               BREASTS:  Deferred                                     LYMPHATIC:  There is no cervical, axillary, or supraclavicular adenopathy.   SKIN:  Warm and moist, without petechiae, rashes, induration, or ecchymoses.           NEUROLOGIC:  DTRs are 0-1+  bilaterally, symmetrical, motor function is 5/5,  and cranial nerves are  within normal limits.     ASSESSMENT  Factor 7 deficiency per history.  Patient was diagnosed due to intermittent but frequent epistaxis as a child.  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  We went over the implications her diagnosis.  I explained to her that there is no preemptive treatment, however, should she ever need a surgical procedure or sustain any injuries, no matter how minor, she should be admitted for factor 7 infusions which will need to be administered every 4 hours for several days postoperatively.  I advised her to schedule any surgical procedures at the [facility] rather than one of the local hospitals.  She voiced understanding.  At this point we will proceed as follows:  We will repeat her CBC today  We will check her ferritin.  If she does have iron deficiency anemia a prescription for oral iron supplements will be called into her pharmacy  We will check thrombin time a fibrinogen level given the questionable history of dysfibrinogenemia  We will check PT T and INR  Last but not least, we will check a factor 7 level.    Her multiple questions were answered to her satisfaction.  I spent approximately 60 minutes reviewing the available records and evaluating the patient, out of which over 50% of the time was spent face to face with the patient in counseling and coordinating this patient's care.  Visit today included increased complexity associated with the care of the factor 7 deficiency and possible disfibrinogenemia.Route Chart for Scheduling    Med Onc Chart Routing      Follow up with physician 3 weeks. labs today see me in 3-4 weeks to discuss herrera results   Follow up with CRISTINA    Infusion scheduling note    Injection scheduling note    Labs     Imaging    Pharmacy appointment    Other referrals

## 2024-10-01 LAB
FACT VII ACT/NOR PPP: 44 % (ref 65–155)
FERRITIN SERPL-MCNC: 99 NG/ML (ref 20–300)

## 2024-10-02 LAB — TT IMM BOVINE THROMBIN PPP: 16.8 SEC (ref 15.8–24.9)

## 2024-10-28 ENCOUNTER — OFFICE VISIT (OUTPATIENT)
Dept: HEMATOLOGY/ONCOLOGY | Facility: CLINIC | Age: 29
End: 2024-10-28
Payer: COMMERCIAL

## 2024-10-28 VITALS
SYSTOLIC BLOOD PRESSURE: 144 MMHG | DIASTOLIC BLOOD PRESSURE: 94 MMHG | HEIGHT: 67 IN | RESPIRATION RATE: 16 BRPM | HEART RATE: 95 BPM | BODY MASS INDEX: 41.84 KG/M2 | OXYGEN SATURATION: 95 % | WEIGHT: 266.56 LBS | TEMPERATURE: 98 F

## 2024-10-28 DIAGNOSIS — D68.2 FACTOR VII DEFICIENCY: Primary | ICD-10-CM

## 2024-10-28 PROCEDURE — 99999 PR PBB SHADOW E&M-EST. PATIENT-LVL III: CPT | Mod: PBBFAC,,, | Performed by: INTERNAL MEDICINE

## 2024-10-28 PROCEDURE — G2211 COMPLEX E/M VISIT ADD ON: HCPCS | Mod: S$GLB,,, | Performed by: INTERNAL MEDICINE

## 2024-10-28 PROCEDURE — 1159F MED LIST DOCD IN RCRD: CPT | Mod: CPTII,S$GLB,, | Performed by: INTERNAL MEDICINE

## 2024-10-28 PROCEDURE — 3008F BODY MASS INDEX DOCD: CPT | Mod: CPTII,S$GLB,, | Performed by: INTERNAL MEDICINE

## 2024-10-28 PROCEDURE — 99214 OFFICE O/P EST MOD 30 MIN: CPT | Mod: S$GLB,,, | Performed by: INTERNAL MEDICINE

## 2024-10-28 PROCEDURE — 3077F SYST BP >= 140 MM HG: CPT | Mod: CPTII,S$GLB,, | Performed by: INTERNAL MEDICINE

## 2024-10-28 PROCEDURE — 3046F HEMOGLOBIN A1C LEVEL >9.0%: CPT | Mod: CPTII,S$GLB,, | Performed by: INTERNAL MEDICINE

## 2024-10-28 PROCEDURE — 3080F DIAST BP >= 90 MM HG: CPT | Mod: CPTII,S$GLB,, | Performed by: INTERNAL MEDICINE

## 2024-11-19 ENCOUNTER — PATIENT MESSAGE (OUTPATIENT)
Dept: RESEARCH | Facility: HOSPITAL | Age: 29
End: 2024-11-19
Payer: COMMERCIAL

## 2024-11-22 ENCOUNTER — PATIENT MESSAGE (OUTPATIENT)
Dept: RESEARCH | Facility: HOSPITAL | Age: 29
End: 2024-11-22
Payer: COMMERCIAL

## 2024-12-23 DIAGNOSIS — E11.69 DM TYPE 2 WITH DIABETIC MIXED HYPERLIPIDEMIA: ICD-10-CM

## 2024-12-23 DIAGNOSIS — E78.2 DM TYPE 2 WITH DIABETIC MIXED HYPERLIPIDEMIA: ICD-10-CM

## 2024-12-23 RX ORDER — SEMAGLUTIDE 0.68 MG/ML
0.5 INJECTION, SOLUTION SUBCUTANEOUS
Qty: 9 ML | Refills: 0 | Status: SHIPPED | OUTPATIENT
Start: 2024-12-23

## 2024-12-23 NOTE — TELEPHONE ENCOUNTER
Please approve for semaglutide (OZEMPIC) 0.25 mg or 0.5 mg (2 mg/3 mL) pen injector     Last OV 07/22/24  Next appt --

## 2024-12-23 NOTE — TELEPHONE ENCOUNTER
Care Due:                  Date            Visit Type   Department     Provider  --------------------------------------------------------------------------------                                NP -                              PRIMARY      [facility] FAMILY  Last Visit: 07-      CARE ([facility])   MEDICINE       Du Roldan  Next Visit: None Scheduled  None         None Found                                                            Last  Test          Frequency    Reason                     Performed    Due Date  --------------------------------------------------------------------------------    HBA1C.......  6 months...  empagliflozin, metFORMIN,   08- 02-                             semaglutide..............    [employer] Embedded Care Due Messages. Reference number: [id number].   12/23/2024 12:19:46 PM CST

## 2024-12-24 NOTE — TELEPHONE ENCOUNTER
Provider Staff:  Action required for this patient    Requires labs      Please see care gap opportunities below in Care Due Message.    Thanks!  [facility]     Appointments      Date Provider   Last Visit   7/22/2024 Du Roldan MD   Next Visit   Visit date not found Du Roldan MD     Refill Decision Note   Lenka SalterKinley  is requesting a refill authorization.  Brief Assessment and Rationale for Refill:  Approve     Medication Therapy Plan:         Comments:     Note composed:7:41 PM 12/23/2024

## 2025-01-03 ENCOUNTER — OFFICE VISIT (OUTPATIENT)
Dept: FAMILY MEDICINE | Facility: CLINIC | Age: 30
End: 2025-01-03
Payer: COMMERCIAL

## 2025-01-03 VITALS
DIASTOLIC BLOOD PRESSURE: 68 MMHG | SYSTOLIC BLOOD PRESSURE: 128 MMHG | BODY MASS INDEX: 43.91 KG/M2 | HEART RATE: 97 BPM | WEIGHT: 263.56 LBS | HEIGHT: 65 IN | OXYGEN SATURATION: 98 %

## 2025-01-03 DIAGNOSIS — D68.2 FACTOR VII DEFICIENCY: ICD-10-CM

## 2025-01-03 DIAGNOSIS — E78.2 DM TYPE 2 WITH DIABETIC MIXED HYPERLIPIDEMIA: ICD-10-CM

## 2025-01-03 DIAGNOSIS — R73.9 HYPERGLYCEMIA: Primary | ICD-10-CM

## 2025-01-03 DIAGNOSIS — E11.69 DM TYPE 2 WITH DIABETIC MIXED HYPERLIPIDEMIA: ICD-10-CM

## 2025-01-03 DIAGNOSIS — E66.01 MORBID OBESITY WITH BMI OF 40.0-44.9, ADULT: ICD-10-CM

## 2025-01-03 DIAGNOSIS — Z88.9 HISTORY OF ALLERGIC REACTION: ICD-10-CM

## 2025-01-03 LAB — GLUCOSE SERPL-MCNC: >450 MG/DL (ref 70–110)

## 2025-01-03 PROCEDURE — 99999 PR PBB SHADOW E&M-EST. PATIENT-LVL IV: CPT | Mod: PBBFAC,,,

## 2025-01-03 RX ORDER — METFORMIN HYDROCHLORIDE 500 MG/1
500 TABLET, EXTENDED RELEASE ORAL 2 TIMES DAILY WITH MEALS
Qty: 60 TABLET | Refills: 5 | Status: SHIPPED | OUTPATIENT
Start: 2025-01-03 | End: 2026-01-03

## 2025-01-03 NOTE — PROGRESS NOTES
[facility]  [address]  [place] [address]    Subjective    Chief Complaint:   Chief Complaint   Patient presents with    Allergic Reaction     Started last Monday pt sts her tongue swole up and her lips were itchy, a couple days later half of her face was swoolen and she had hives on her right side of her body.       History of Present Illness:     Shankar Barboza is a(n) 29 y.o. female with past medical history as noted below who presents to the clinic today for ER follow-up. She is present with a family member.      Patient was seen in [facility] 12/28/2024 for hives and facial swelling, thought to be allergic reaction of unclear cause. She was given Decadron and improved clinically, so she was discharged on prednisone, benadryl PRN, Zyrtec and Pepcid. She was also prescribed an Epipen to use in case of anaphylaxis. She has had no recurrence in symptoms since discharge. She is scheduled to see an allergist on 01/07/2025. It was advised she stop her Ozempic until cleared by allergy.     Today she reports polyuria, polydipsia, and blurred vision. Last A1c 10.5%. She is currently on do oral diabetic meds or insulin. No abdominal pain, nausea, vomiting, or confusion.      Problem List:   Patient Active Problem List   Diagnosis    Factor VII deficiency    Elevated glucose level    Menorrhagia    DM type 2 with diabetic mixed hyperlipidemia    Morbid obesity with BMI of 40.0-44.9, adult       Current Outpatient Medications:   Current Outpatient Medications   Medication Instructions    coagulation factor VIIa,recomb (NOVOSEVEN IV) Inject into the vein.    empagliflozin (JARDIANCE) 25 mg, Oral, Daily    metFORMIN (GLUCOPHAGE-XR) 500 mg, Oral, With breakfast    OZEMPIC 0.5 mg, Subcutaneous, Every 7 days    rosuvastatin (CRESTOR) 5 mg, Oral, Daily       Surgical History:   Past Surgical History:   Procedure Laterality Date    HIP SURGERY Left     KNEE SURGERY  Left     MOUTH SURGERY Bilateral     MOUTH SURGERY      TONSILLECTOMY AND ADENOIDECTOMY         Family History:   Family History   Problem Relation Name Age of Onset    Breast cancer Neg Hx      Colon cancer Neg Hx      Ovarian cancer Neg Hx      Uterine cancer Neg Hx      Cervical cancer Neg Hx         Allergies:   Review of patient's allergies indicates:   Allergen Reactions    Acetaminophen Swelling and Shortness Of Breath     Cough^ITCHING    Aspirin Other (See Comments)     Other Reaction(s): Other (See Comments)    Bleeding disoprder    Pizza natural flavor      Rash around mouth/Stomach upset/Occasional Vomiting       Tobacco Status:   Tobacco Use: Low Risk  (12/28/2024)    Received from [facility]    Patient History     Smoking Tobacco Use: Never     Smokeless Tobacco Use: Never     Passive Exposure: Not on file       Sexual Activity:   Social History     Substance and Sexual Activity   Sexual Activity Not Currently       Alcohol Use:   Social History     Substance and Sexual Activity   Alcohol Use Yes    Comment: occ/rarely         Objective       There were no vitals filed for this visit.    Review of Systems   Constitutional:  Negative for chills and fever.   Eyes:  Positive for blurred vision. Negative for double vision, photophobia, pain, discharge and redness.   Respiratory:  Negative for cough and shortness of breath.    Cardiovascular:  Negative for chest pain.   Genitourinary:  Positive for frequency.   Endo/Heme/Allergies:  Positive for polydipsia.   Psychiatric/Behavioral:  Negative for memory loss.        Physical Exam  Constitutional:       General: She is not in acute distress.     Appearance: Normal appearance.   HENT:      Head: Normocephalic and atraumatic.      Mouth/Throat:      Mouth: Mucous membranes are dry.   Eyes:      Visual Fields: Right eye visual fields normal and left eye visual fields normal.   Cardiovascular:      Rate and Rhythm: Normal rate and regular rhythm.       Heart sounds: Normal heart sounds. No murmur heard.  Pulmonary:      Effort: Pulmonary effort is normal. No respiratory distress.      Breath sounds: Normal breath sounds. No wheezing.   Skin:     General: Skin is warm.   Neurological:      General: No focal deficit present.      Mental Status: She is alert and oriented to person, place, and time. Mental status is at baseline.      Motor: No weakness.      Coordination: Coordination normal.   Psychiatric:         Behavior: Behavior normal.       Component      Latest Ref Rng 1/3/2025   POC Glucose      70 - 110 MG/DL >450          Assessment and Plan:    1. Hyperglycemia    2. History of allergic reaction    3. DM type 2 with diabetic mixed hyperlipidemia  -     POCT Urinalysis(Instrument)  -     POCT Glucose, Hand-Held Device  -     metFORMIN (GLUCOPHAGE-XR) 500 MG ER 24hr tablet; Take 1 tablet (500 mg total) by mouth 2 (two) times daily with meals.  Dispense: 60 tablet; Refill: 5  -     empagliflozin (JARDIANCE) 25 mg tablet; Take 1 tablet (25 mg total) by mouth once daily.  Dispense: 30 tablet; Refill: 5  -     Ambulatory referral/consult to Optometry; Future; Expected date: 01/10/2025    4. Morbid obesity with BMI of 40.0-44.9, adult    5. Factor VII deficiency        Visit summary:    Shankar Barboza presented today for ER follow up.    Symptomatic hyperglycemia today with c/o blurred vision. Concern of DKA vs HHS. Recommend patient report to ER. She plans to go to [facility], I called the ED and gave checkout to Jose. Her family member is bringing her there.    Restart Metformin 500 mg BID, Jardiance 25 mg. Likely will need additional control with GLP-1 once cleared by allergy. Low suspicion this was related to allergic reaction.     F/u with external allergist as scheduled on 01/07/2025. We reviewed how to administer Epipen if needed. Patient aware to also call 911 in this scenario. Printed materials given.     No signs of angioedema or other allergic  reaction today.      Follow up: with our clinic when discharged      Maria Eugenia Munguia PA-C    This note was created partially with voice dictation software and is prone to errors. This note has been reviewed by me but some errors are inevitable.

## 2025-01-06 ENCOUNTER — PATIENT OUTREACH (OUTPATIENT)
Dept: ADMINISTRATIVE | Facility: HOSPITAL | Age: 30
End: 2025-01-06
Payer: COMMERCIAL

## 2025-01-06 NOTE — PROGRESS NOTES
VBC Patients with ED Visit in Last 7 Days  Left message to return call to clinic to schedule ED fu

## 2025-02-05 ENCOUNTER — LAB VISIT (OUTPATIENT)
Dept: LAB | Facility: HOSPITAL | Age: 30
End: 2025-02-05
Attending: INTERNAL MEDICINE
Payer: COMMERCIAL

## 2025-02-05 ENCOUNTER — OFFICE VISIT (OUTPATIENT)
Dept: FAMILY MEDICINE | Facility: CLINIC | Age: 30
End: 2025-02-05
Payer: COMMERCIAL

## 2025-02-05 VITALS
SYSTOLIC BLOOD PRESSURE: 132 MMHG | WEIGHT: 254.44 LBS | DIASTOLIC BLOOD PRESSURE: 84 MMHG | HEIGHT: 65 IN | RESPIRATION RATE: 18 BRPM | BODY MASS INDEX: 42.39 KG/M2

## 2025-02-05 DIAGNOSIS — Z00.00 WELLNESS EXAMINATION: Primary | ICD-10-CM

## 2025-02-05 DIAGNOSIS — L50.9 URTICARIA: ICD-10-CM

## 2025-02-05 DIAGNOSIS — T78.40XS ALLERGIC REACTION, SEQUELA: ICD-10-CM

## 2025-02-05 DIAGNOSIS — Z00.00 WELLNESS EXAMINATION: ICD-10-CM

## 2025-02-05 DIAGNOSIS — E11.69 DM TYPE 2 WITH DIABETIC MIXED HYPERLIPIDEMIA: ICD-10-CM

## 2025-02-05 DIAGNOSIS — E61.1 IRON DEFICIENCY: ICD-10-CM

## 2025-02-05 DIAGNOSIS — E66.01 CLASS 3 SEVERE OBESITY WITH SERIOUS COMORBIDITY AND BODY MASS INDEX (BMI) OF 40.0 TO 44.9 IN ADULT, UNSPECIFIED OBESITY TYPE: ICD-10-CM

## 2025-02-05 DIAGNOSIS — E78.2 DM TYPE 2 WITH DIABETIC MIXED HYPERLIPIDEMIA: ICD-10-CM

## 2025-02-05 DIAGNOSIS — R79.89 ABNORMAL CBC: ICD-10-CM

## 2025-02-05 DIAGNOSIS — R53.83 FATIGUE, UNSPECIFIED TYPE: ICD-10-CM

## 2025-02-05 DIAGNOSIS — Z11.59 NEED FOR HEPATITIS C SCREENING TEST: ICD-10-CM

## 2025-02-05 DIAGNOSIS — E66.813 CLASS 3 SEVERE OBESITY WITH SERIOUS COMORBIDITY AND BODY MASS INDEX (BMI) OF 40.0 TO 44.9 IN ADULT, UNSPECIFIED OBESITY TYPE: ICD-10-CM

## 2025-02-05 DIAGNOSIS — T78.40XA ALLERGIC REACTION, INITIAL ENCOUNTER: ICD-10-CM

## 2025-02-05 LAB
ANION GAP SERPL CALC-SCNC: 10 MMOL/L (ref 8–16)
BUN SERPL-MCNC: 7 MG/DL (ref 6–20)
CALCIUM SERPL-MCNC: 9.6 MG/DL (ref 8.7–10.5)
CHLORIDE SERPL-SCNC: 99 MMOL/L (ref 95–110)
CHOLEST SERPL-MCNC: 248 MG/DL (ref 120–199)
CHOLEST/HDLC SERPL: 7.3 {RATIO} (ref 2–5)
CO2 SERPL-SCNC: 25 MMOL/L (ref 23–29)
CREAT SERPL-MCNC: 0.8 MG/DL (ref 0.5–1.4)
EST. GFR  (NO RACE VARIABLE): >60 ML/MIN/1.73 M^2
ESTIMATED AVG GLUCOSE: 335 MG/DL (ref 68–131)
GLUCOSE SERPL-MCNC: 339 MG/DL (ref 70–110)
HBA1C MFR BLD: 13.3 % (ref 4–5.6)
HDLC SERPL-MCNC: 34 MG/DL (ref 40–75)
HDLC SERPL: 13.7 % (ref 20–50)
IRON SERPL-MCNC: 49 UG/DL (ref 30–160)
LDLC SERPL CALC-MCNC: 181.2 MG/DL (ref 63–159)
NONHDLC SERPL-MCNC: 214 MG/DL
POTASSIUM SERPL-SCNC: 4.5 MMOL/L (ref 3.5–5.1)
SATURATED IRON: 18 % (ref 20–50)
SODIUM SERPL-SCNC: 134 MMOL/L (ref 136–145)
TOTAL IRON BINDING CAPACITY: 274 UG/DL (ref 250–450)
TRANSFERRIN SERPL-MCNC: 185 MG/DL (ref 200–375)
TRIGL SERPL-MCNC: 164 MG/DL (ref 30–150)

## 2025-02-05 PROCEDURE — 1159F MED LIST DOCD IN RCRD: CPT | Mod: CPTII,S$GLB,, | Performed by: INTERNAL MEDICINE

## 2025-02-05 PROCEDURE — G2211 COMPLEX E/M VISIT ADD ON: HCPCS | Mod: S$GLB,,, | Performed by: INTERNAL MEDICINE

## 2025-02-05 PROCEDURE — 3008F BODY MASS INDEX DOCD: CPT | Mod: CPTII,S$GLB,, | Performed by: INTERNAL MEDICINE

## 2025-02-05 PROCEDURE — 3075F SYST BP GE 130 - 139MM HG: CPT | Mod: CPTII,S$GLB,, | Performed by: INTERNAL MEDICINE

## 2025-02-05 PROCEDURE — 3079F DIAST BP 80-89 MM HG: CPT | Mod: CPTII,S$GLB,, | Performed by: INTERNAL MEDICINE

## 2025-02-05 PROCEDURE — 1160F RVW MEDS BY RX/DR IN RCRD: CPT | Mod: CPTII,S$GLB,, | Performed by: INTERNAL MEDICINE

## 2025-02-05 PROCEDURE — 83540 ASSAY OF IRON: CPT | Performed by: INTERNAL MEDICINE

## 2025-02-05 PROCEDURE — 99214 OFFICE O/P EST MOD 30 MIN: CPT | Mod: S$GLB,,, | Performed by: INTERNAL MEDICINE

## 2025-02-05 PROCEDURE — 86003 ALLG SPEC IGE CRUDE XTRC EA: CPT | Mod: 59 | Performed by: INTERNAL MEDICINE

## 2025-02-05 PROCEDURE — 80048 BASIC METABOLIC PNL TOTAL CA: CPT | Performed by: INTERNAL MEDICINE

## 2025-02-05 PROCEDURE — 80061 LIPID PANEL: CPT | Performed by: INTERNAL MEDICINE

## 2025-02-05 PROCEDURE — 83036 HEMOGLOBIN GLYCOSYLATED A1C: CPT | Performed by: INTERNAL MEDICINE

## 2025-02-05 PROCEDURE — 99999 PR PBB SHADOW E&M-EST. PATIENT-LVL III: CPT | Mod: PBBFAC,,, | Performed by: INTERNAL MEDICINE

## 2025-02-05 PROCEDURE — 86003 ALLG SPEC IGE CRUDE XTRC EA: CPT | Performed by: INTERNAL MEDICINE

## 2025-02-05 PROCEDURE — 36415 COLL VENOUS BLD VENIPUNCTURE: CPT | Mod: PN | Performed by: INTERNAL MEDICINE

## 2025-02-05 RX ORDER — INSULIN PUMP SYRINGE, 3 ML
EACH MISCELLANEOUS
Qty: 1 EACH | Refills: 0 | Status: SHIPPED | OUTPATIENT
Start: 2025-02-05

## 2025-02-05 RX ORDER — LANCETS
EACH MISCELLANEOUS
Qty: 200 EACH | Refills: 12 | Status: SHIPPED | OUTPATIENT
Start: 2025-02-05

## 2025-02-05 RX ORDER — METFORMIN HYDROCHLORIDE 500 MG/1
500 TABLET, EXTENDED RELEASE ORAL 2 TIMES DAILY WITH MEALS
Qty: 60 TABLET | Refills: 3 | Status: SHIPPED | OUTPATIENT
Start: 2025-02-05 | End: 2026-02-05

## 2025-02-05 NOTE — PROGRESS NOTES
Subjective:       Patient ID: Shankar Barboza is a 29 y.o. female.    Chief Complaint: Annual Exam (New Insurance )   HPI     History of Present Illness            Gyn: Dr Foreman   Immunizations: Flu: Tdap: booster recommend.  Prevnar 20: next visit   Smoker: never      New to clinic  Establish care  Moved back 1 yr [place]  Works at 's office       Wellness  F/u  Does have new insurance working at [employer]    Went to ER twice Dec and Jan /Both after eating fried chicken - never had before.  Fried chicken since I had a different restaurant without issues.  Could not afford co-pay to see allergist.  Did not get EpiPen not covered.  1 of the episodes ER visit did have tongue swelling throat irritation.      Has had a reaction in the past to eating pizza.  Lips swelling itching, nausea and vomit.  Has continued to eat pizza but does not eat pepperoni pizza any longer, does not let the pizza touch her lips.  Has not had reaction since.  Did have allergic reaction once when eating crawfish but has continued E crawfish regularly without issue.    No other food allergies that she can think of.   Off Ozempic since ER visit would like to restart able    Restarted Metformin Er 500 bid, Jardiance 25.  Has noticed improved diabetic symptoms although not checking sugar levels  Blurry vision urination has decreased    Does crave water occasional ice.  Needs A1c  Does have history of iron-deficiency history of getting IV infusions in the past        noncompliant with visits and lab Glucose 542.  While taking steroids  August last year  Last A1c 10.5 has not been updated       Type 2 diabetes:  Previously on GLP 1 holding since ER visit.  Rx metformin  bid, Jardiance 25.   Diagnosed early 20's.  Previous Drink sweet tea   Highest 10.5      Mixed HLD/low HDL: LDL goal < 70 //L 164 & H 30's  not taking Statin               LDL highest 178     Factor 7 deficiency:  Previous severe menorrhagia.  Manage OCP.   Mgmt  Heme  Dr KATIUSKA Cárdenas NSAIDs.  Developed allergy  Tylenol.    Iron deficiency:  No anemia.  Iron sat 14.  Recommend Rx OTC MVI with iron daily     Metabolic syndrome: BMI 43/274, 42/254.   ____________________________________________________________________________________________________  Assessment & Plan:  1. Wellness examination  - Iron and TIBC; Future  - ALLERGEN CHICKEN; Future  - Hemoglobin A1C; Future  - Lipid Panel; Future  - ALLERGEN PEANUT; Future    2. Urticaria  - ALLERGEN CHICKEN; Future  - ALLERGEN PEANUT; Future    3. DM type 2 with diabetic mixed hyperlipidemia  - Hemoglobin A1C; Future  - Basic Metabolic Panel; Future  - Lipid Panel; Future  - blood-glucose meter kit; To check BG 2 times daily, to use with insurance preferred meter  Dispense: 1 each; Refill: 0  - lancets Misc; To check BG daily , to use with insurance preferred meter  Dispense: 200 each; Refill: 12  - blood sugar diagnostic Strp; To check BG daily , to use with insurance preferred meter  Dispense: 200 strip; Refill: 12  - metFORMIN (GLUCOPHAGE-XR) 500 MG ER 24hr tablet; Take 1 tablet (500 mg total) by mouth 2 (two) times daily with meals.  Dispense: 60 tablet; Refill: 3  - empagliflozin (JARDIANCE) 25 mg tablet; Take 1 tablet (25 mg total) by mouth once daily.  Dispense: 30 tablet; Refill: 3    4. Iron deficiency  - Iron and TIBC; Future    5. Allergic reaction, sequela  - ALLERGEN CHICKEN; Future  - ALLERGEN PEANUT; Future    6. Class 3 severe obesity with serious comorbidity and body mass index (BMI) of 40.0 to 44.9 in adult, unspecified obesity type     Wellness examination  -     Iron and TIBC; Future; Expected date: 02/05/2025  -     ALLERGEN CHICKEN; Future; Expected date: 02/05/2025  -     Hemoglobin A1C; Future; Expected date: 02/05/2025  -     Lipid Panel; Future; Expected date: 02/05/2025  -     ALLERGEN PEANUT; Future; Expected date: 02/05/2025    Urticaria  -     ALLERGEN CHICKEN; Future; Expected date: 02/05/2025  -      ALLERGEN PEANUT; Future; Expected date: 02/05/2025    DM type 2 with diabetic mixed hyperlipidemia  -     Hemoglobin A1C; Future; Expected date: 02/05/2025  -     Basic Metabolic Panel; Future; Expected date: 02/05/2025  -     Lipid Panel; Future; Expected date: 02/05/2025  -     blood-glucose meter kit; To check BG 2 times daily, to use with insurance preferred meter  Dispense: 1 each; Refill: 0  -     lancets Misc; To check BG daily , to use with insurance preferred meter  Dispense: 200 each; Refill: 12  -     blood sugar diagnostic Strp; To check BG daily , to use with insurance preferred meter  Dispense: 200 strip; Refill: 12  -     metFORMIN (GLUCOPHAGE-XR) 500 MG ER 24hr tablet; Take 1 tablet (500 mg total) by mouth 2 (two) times daily with meals.  Dispense: 60 tablet; Refill: 3  -     empagliflozin (JARDIANCE) 25 mg tablet; Take 1 tablet (25 mg total) by mouth once daily.  Dispense: 30 tablet; Refill: 3    Iron deficiency  -     Iron and TIBC; Future; Expected date: 02/05/2025    Allergic reaction, sequela  -     ALLERGEN CHICKEN; Future; Expected date: 02/05/2025  -     ALLERGEN PEANUT; Future; Expected date: 02/05/2025    Class 3 severe obesity with serious comorbidity and body mass index (BMI) of 40.0 to 44.9 in adult, unspecified obesity type        Continue to work on maintain healthy weight, balanced diet. Avoid unhealthy habits: smoking/vaping, excessive alcohol intake.     Recommend diet exercise:  High protein, low fat, low carb diet - calories women under <1200 & men <1800, carbs <100 G, protein 50-60 G daily. Protein supplements to replace one meal.  Keep all beverages < 10 calories per serving. Keep snacks < 100 calories.   Recommend exercise 160 minutes per week, combo of cardio and weight/strength training.     Visit today included increased complexity associated with the care of the episodic problem addressed and managing the longitudinal care of the patient due to the serious and/or complex  "managed problem(s).  Diabetes      Disclaimer: This note was partly generated using dictation software which may occasionally result in transcription errors  ____________________________________________________________________________________________________  Review of Systems:  Review of Systems      Negative     Objective:     Wt Readings from Last 3 Encounters:   02/05/25 115.4 kg (254 lb 6.6 oz)   01/03/25 119.3 kg (263 lb)   01/03/25 119.6 kg (263 lb 9 oz)     BP Readings from Last 3 Encounters:   02/05/25 132/84   01/03/25 129/87   01/03/25 128/68       Lab Results   Component Value Date    WBC 7.93 01/03/2025    HGB 12.2 01/03/2025    HCT 38.8 01/03/2025     01/03/2025     (L) 01/03/2025    K 3.7 01/03/2025    CL 97 01/03/2025    ALT 9 (L) 01/03/2025    AST 9 (L) 01/03/2025    CO2 27 01/03/2025    CREATININE 0.61 01/03/2025    BUN 10 01/03/2025     (HH) 01/03/2025      Hemoglobin A1C   Date Value Ref Range Status   08/05/2024 10.5 (H) 4.0 - 5.6 % Final     Comment:     ADA Screening Guidelines:  5.7-6.4%  Consistent with prediabetes  >or=6.5%  Consistent with diabetes    High levels of fetal hemoglobin interfere with the HbA1C  assay. Heterozygous hemoglobin variants (HbS, HgC, etc)do  not significantly interfere with this assay.   However, presence of multiple variants may affect accuracy.        Lab Results   Component Value Date    TSH 1.823 08/05/2024     No results found for: "FREET4"  Lab Results   Component Value Date    LDLCALC 164.6 (H) 08/05/2024    LDLCALC 178 (H) 12/05/2023     Lab Results   Component Value Date    TRIG 122 08/05/2024    TRIG 102 12/05/2023            Physical Exam      Constitutional:       Appearance: Normal appearance.   HENT:      Head: Normocephalic and atraumatic.   Eyes:      Extraocular Movements: Extraocular movements intact.      Pupils: Pupils are equal, round, and reactive to light.   Cardiovascular:      Rate and Rhythm: Normal rate.   Pulmonary: "      Effort: Pulmonary effort is normal.   Neurological:      Mental Status: She is alert and oriented to person, place, and time.   Psychiatric:         Mood and Affect: Mood normal.     Medication List with Changes/Refills   New Medications    BLOOD SUGAR DIAGNOSTIC STRP    To check BG daily , to use with insurance preferred meter    BLOOD-GLUCOSE METER KIT    To check BG 2 times daily, to use with insurance preferred meter    LANCETS MISC    To check BG daily , to use with insurance preferred meter   Current Medications    COAGULATION FACTOR VIIA,RECOMB (NOVOSEVEN IV)    Inject into the vein.    ROSUVASTATIN (CRESTOR) 5 MG TABLET    Take 1 tablet (5 mg total) by mouth once daily.    SEMAGLUTIDE (OZEMPIC) 0.25 MG OR 0.5 MG (2 MG/3 ML) PEN INJECTOR    Inject 0.5 mg into the skin every 7 days.   Changed and/or Refilled Medications    Modified Medication Previous Medication    EMPAGLIFLOZIN (JARDIANCE) 25 MG TABLET empagliflozin (JARDIANCE) 25 mg tablet       Take 1 tablet (25 mg total) by mouth once daily.    Take 1 tablet (25 mg total) by mouth once daily.    METFORMIN (GLUCOPHAGE-XR) 500 MG ER 24HR TABLET metFORMIN (GLUCOPHAGE-XR) 500 MG ER 24hr tablet       Take 1 tablet (500 mg total) by mouth 2 (two) times daily with meals.    Take 1 tablet (500 mg total) by mouth 2 (two) times daily with meals.

## 2025-02-10 DIAGNOSIS — E11.65 UNCONTROLLED TYPE 2 DIABETES MELLITUS WITH HYPERGLYCEMIA: ICD-10-CM

## 2025-02-10 DIAGNOSIS — E11.69 DM TYPE 2 WITH DIABETIC MIXED HYPERLIPIDEMIA: Primary | ICD-10-CM

## 2025-02-10 DIAGNOSIS — E78.2 DM TYPE 2 WITH DIABETIC MIXED HYPERLIPIDEMIA: Primary | ICD-10-CM

## 2025-02-10 LAB
CHICKEN IGE QN: <0.1 KU/L
PEANUT IGE QN: <0.1 KU/L
RAST CLASS: NORMAL
RAST CLASS: NORMAL

## 2025-02-10 RX ORDER — INSULIN GLARGINE AND LIXISENATIDE 100; 33 U/ML; UG/ML
10 INJECTION, SOLUTION SUBCUTANEOUS
Qty: 1 PEN | Refills: 2 | Status: SHIPPED | OUTPATIENT
Start: 2025-02-10 | End: 2026-02-10

## 2025-02-10 RX ORDER — PEN NEEDLE, DIABETIC 30 GX3/16"
1 NEEDLE, DISPOSABLE MISCELLANEOUS DAILY
Qty: 30 EACH | Refills: 12 | Status: SHIPPED | OUTPATIENT
Start: 2025-02-10

## 2025-04-21 ENCOUNTER — LAB VISIT (OUTPATIENT)
Dept: LAB | Facility: HOSPITAL | Age: 30
End: 2025-04-21
Attending: INTERNAL MEDICINE
Payer: COMMERCIAL

## 2025-04-21 DIAGNOSIS — D68.2 FACTOR VII DEFICIENCY: ICD-10-CM

## 2025-04-21 LAB
ABSOLUTE EOSINOPHIL (OHS): 0.15 K/UL
ABSOLUTE MONOCYTE (OHS): 0.4 K/UL (ref 0.3–1)
ABSOLUTE NEUTROPHIL COUNT (OHS): 3.31 K/UL (ref 1.8–7.7)
BASOPHILS # BLD AUTO: 0.03 K/UL
BASOPHILS NFR BLD AUTO: 0.5 %
ERYTHROCYTE [DISTWIDTH] IN BLOOD BY AUTOMATED COUNT: 14.1 % (ref 11.5–14.5)
FERRITIN SERPL-MCNC: 96 NG/ML (ref 20–300)
HCT VFR BLD AUTO: 39.4 % (ref 37–48.5)
HGB BLD-MCNC: 12.7 GM/DL (ref 12–16)
IMM GRANULOCYTES # BLD AUTO: 0.01 K/UL (ref 0–0.04)
IMM GRANULOCYTES NFR BLD AUTO: 0.2 % (ref 0–0.5)
LYMPHOCYTES # BLD AUTO: 1.7 K/UL (ref 1–4.8)
MCH RBC QN AUTO: 25 PG (ref 27–31)
MCHC RBC AUTO-ENTMCNC: 32.2 G/DL (ref 32–36)
MCV RBC AUTO: 78 FL (ref 82–98)
NUCLEATED RBC (/100WBC) (OHS): 0 /100 WBC
PLATELET # BLD AUTO: 303 K/UL (ref 150–450)
PMV BLD AUTO: 9.4 FL (ref 9.2–12.9)
RBC # BLD AUTO: 5.07 M/UL (ref 4–5.4)
RELATIVE EOSINOPHIL (OHS): 2.7 %
RELATIVE LYMPHOCYTE (OHS): 30.4 % (ref 18–48)
RELATIVE MONOCYTE (OHS): 7.1 % (ref 4–15)
RELATIVE NEUTROPHIL (OHS): 59.1 % (ref 38–73)
WBC # BLD AUTO: 5.6 K/UL (ref 3.9–12.7)

## 2025-04-21 PROCEDURE — 36415 COLL VENOUS BLD VENIPUNCTURE: CPT | Mod: PN

## 2025-04-21 PROCEDURE — 85025 COMPLETE CBC W/AUTO DIFF WBC: CPT | Mod: PN

## 2025-04-21 PROCEDURE — 82728 ASSAY OF FERRITIN: CPT

## 2025-04-21 PROCEDURE — 81269 HBA1/HBA2 GENE DUP/DEL VRNTS: CPT | Mod: PN

## 2025-06-04 ENCOUNTER — TELEPHONE (OUTPATIENT)
Dept: FAMILY MEDICINE | Facility: CLINIC | Age: 30
End: 2025-06-04
Payer: COMMERCIAL

## 2025-06-05 ENCOUNTER — OFFICE VISIT (OUTPATIENT)
Dept: HEMATOLOGY/ONCOLOGY | Facility: CLINIC | Age: 30
End: 2025-06-05
Payer: COMMERCIAL

## 2025-06-05 ENCOUNTER — OFFICE VISIT (OUTPATIENT)
Dept: OBSTETRICS AND GYNECOLOGY | Facility: CLINIC | Age: 30
End: 2025-06-05
Payer: COMMERCIAL

## 2025-06-05 VITALS
SYSTOLIC BLOOD PRESSURE: 124 MMHG | BODY MASS INDEX: 42.38 KG/M2 | WEIGHT: 246.94 LBS | DIASTOLIC BLOOD PRESSURE: 82 MMHG

## 2025-06-05 VITALS
HEART RATE: 98 BPM | SYSTOLIC BLOOD PRESSURE: 124 MMHG | HEIGHT: 64 IN | OXYGEN SATURATION: 98 % | WEIGHT: 247.13 LBS | RESPIRATION RATE: 18 BRPM | DIASTOLIC BLOOD PRESSURE: 81 MMHG | BODY MASS INDEX: 42.19 KG/M2 | TEMPERATURE: 98 F

## 2025-06-05 DIAGNOSIS — D56.3 ALPHA THALASSEMIA TRAIT: ICD-10-CM

## 2025-06-05 DIAGNOSIS — N39.46 MIXED INCONTINENCE: ICD-10-CM

## 2025-06-05 DIAGNOSIS — E61.1 IRON DEFICIENCY: Primary | ICD-10-CM

## 2025-06-05 DIAGNOSIS — R35.0 URINARY FREQUENCY: Primary | ICD-10-CM

## 2025-06-05 DIAGNOSIS — D68.2 FACTOR VII DEFICIENCY: ICD-10-CM

## 2025-06-05 LAB
BACTERIA #/AREA URNS AUTO: NORMAL /HPF
BILIRUB UR QL STRIP.AUTO: NEGATIVE
CLARITY UR: CLEAR
COLOR UR AUTO: COLORLESS
GLUCOSE UR QL STRIP: ABNORMAL
HGB UR QL STRIP: NEGATIVE
KETONES UR QL STRIP: ABNORMAL
LEUKOCYTE ESTERASE UR QL STRIP: NEGATIVE
MICROSCOPIC COMMENT: NORMAL
NITRITE UR QL STRIP: NEGATIVE
PH UR STRIP: 7 [PH]
PROT UR QL STRIP: NEGATIVE
RBC #/AREA URNS AUTO: 1 /HPF (ref 0–4)
SP GR UR STRIP: >=1.03
SQUAMOUS #/AREA URNS AUTO: 3 /HPF
UROBILINOGEN UR STRIP-ACNC: NEGATIVE EU/DL
WBC #/AREA URNS AUTO: <1 /HPF (ref 0–5)
YEAST UR QL AUTO: NORMAL /HPF

## 2025-06-05 PROCEDURE — 3008F BODY MASS INDEX DOCD: CPT | Mod: CPTII,S$GLB,, | Performed by: INTERNAL MEDICINE

## 2025-06-05 PROCEDURE — 3074F SYST BP LT 130 MM HG: CPT | Mod: CPTII,S$GLB,, | Performed by: INTERNAL MEDICINE

## 2025-06-05 PROCEDURE — 99999 PR PBB SHADOW E&M-EST. PATIENT-LVL III: CPT | Mod: PBBFAC,,, | Performed by: OBSTETRICS & GYNECOLOGY

## 2025-06-05 PROCEDURE — 3046F HEMOGLOBIN A1C LEVEL >9.0%: CPT | Mod: CPTII,S$GLB,, | Performed by: INTERNAL MEDICINE

## 2025-06-05 PROCEDURE — G2211 COMPLEX E/M VISIT ADD ON: HCPCS | Mod: S$GLB,,, | Performed by: INTERNAL MEDICINE

## 2025-06-05 PROCEDURE — 3046F HEMOGLOBIN A1C LEVEL >9.0%: CPT | Mod: CPTII,S$GLB,, | Performed by: OBSTETRICS & GYNECOLOGY

## 2025-06-05 PROCEDURE — 3079F DIAST BP 80-89 MM HG: CPT | Mod: CPTII,S$GLB,, | Performed by: INTERNAL MEDICINE

## 2025-06-05 PROCEDURE — 3079F DIAST BP 80-89 MM HG: CPT | Mod: CPTII,S$GLB,, | Performed by: OBSTETRICS & GYNECOLOGY

## 2025-06-05 PROCEDURE — 99215 OFFICE O/P EST HI 40 MIN: CPT | Mod: S$GLB,,, | Performed by: INTERNAL MEDICINE

## 2025-06-05 PROCEDURE — 1159F MED LIST DOCD IN RCRD: CPT | Mod: CPTII,S$GLB,, | Performed by: INTERNAL MEDICINE

## 2025-06-05 PROCEDURE — 99213 OFFICE O/P EST LOW 20 MIN: CPT | Mod: S$GLB,,, | Performed by: OBSTETRICS & GYNECOLOGY

## 2025-06-05 PROCEDURE — 3008F BODY MASS INDEX DOCD: CPT | Mod: CPTII,S$GLB,, | Performed by: OBSTETRICS & GYNECOLOGY

## 2025-06-05 PROCEDURE — 81001 URINALYSIS AUTO W/SCOPE: CPT | Performed by: OBSTETRICS & GYNECOLOGY

## 2025-06-05 PROCEDURE — 1159F MED LIST DOCD IN RCRD: CPT | Mod: CPTII,S$GLB,, | Performed by: OBSTETRICS & GYNECOLOGY

## 2025-06-05 PROCEDURE — 3074F SYST BP LT 130 MM HG: CPT | Mod: CPTII,S$GLB,, | Performed by: OBSTETRICS & GYNECOLOGY

## 2025-06-05 PROCEDURE — 99999 PR PBB SHADOW E&M-EST. PATIENT-LVL III: CPT | Mod: PBBFAC,,, | Performed by: INTERNAL MEDICINE

## 2025-06-12 ENCOUNTER — LAB VISIT (OUTPATIENT)
Dept: LAB | Facility: HOSPITAL | Age: 30
End: 2025-06-12
Payer: COMMERCIAL

## 2025-06-12 ENCOUNTER — OFFICE VISIT (OUTPATIENT)
Dept: FAMILY MEDICINE | Facility: CLINIC | Age: 30
End: 2025-06-12
Payer: COMMERCIAL

## 2025-06-12 ENCOUNTER — TELEPHONE (OUTPATIENT)
Dept: FAMILY MEDICINE | Facility: CLINIC | Age: 30
End: 2025-06-12

## 2025-06-12 VITALS
RESPIRATION RATE: 18 BRPM | SYSTOLIC BLOOD PRESSURE: 118 MMHG | OXYGEN SATURATION: 99 % | BODY MASS INDEX: 41.37 KG/M2 | DIASTOLIC BLOOD PRESSURE: 82 MMHG | HEIGHT: 64 IN | WEIGHT: 242.31 LBS | HEART RATE: 96 BPM

## 2025-06-12 DIAGNOSIS — F43.23 REACTION, ADJUSTMENT, WITH ANXIOUS, DEPRESSED MOOD: ICD-10-CM

## 2025-06-12 DIAGNOSIS — E11.69 DM TYPE 2 WITH DIABETIC MIXED HYPERLIPIDEMIA: Primary | ICD-10-CM

## 2025-06-12 DIAGNOSIS — F43.20 BEREAVEMENT REACTION: ICD-10-CM

## 2025-06-12 DIAGNOSIS — E11.69 DM TYPE 2 WITH DIABETIC MIXED HYPERLIPIDEMIA: ICD-10-CM

## 2025-06-12 DIAGNOSIS — Z00.00 WELLNESS EXAMINATION: ICD-10-CM

## 2025-06-12 DIAGNOSIS — Z63.4 BEREAVEMENT REACTION: ICD-10-CM

## 2025-06-12 DIAGNOSIS — E78.2 DM TYPE 2 WITH DIABETIC MIXED HYPERLIPIDEMIA: Primary | ICD-10-CM

## 2025-06-12 DIAGNOSIS — F41.9 ANXIETY: ICD-10-CM

## 2025-06-12 DIAGNOSIS — L70.9 ACNE, UNSPECIFIED ACNE TYPE: ICD-10-CM

## 2025-06-12 DIAGNOSIS — E78.2 DM TYPE 2 WITH DIABETIC MIXED HYPERLIPIDEMIA: ICD-10-CM

## 2025-06-12 LAB
ALBUMIN SERPL BCP-MCNC: 4 G/DL (ref 3.5–5.2)
ALP SERPL-CCNC: 92 UNIT/L (ref 40–150)
ALT SERPL W/O P-5'-P-CCNC: 8 UNIT/L (ref 10–44)
ANION GAP (OHS): 9 MMOL/L (ref 8–16)
AST SERPL-CCNC: 9 UNIT/L (ref 11–45)
BILIRUB SERPL-MCNC: 0.3 MG/DL (ref 0.1–1)
BUN SERPL-MCNC: 13 MG/DL (ref 6–20)
CALCIUM SERPL-MCNC: 9.4 MG/DL (ref 8.7–10.5)
CHLORIDE SERPL-SCNC: 104 MMOL/L (ref 95–110)
CHOLEST SERPL-MCNC: 269 MG/DL (ref 120–199)
CHOLEST/HDLC SERPL: 7.5 {RATIO} (ref 2–5)
CO2 SERPL-SCNC: 24 MMOL/L (ref 23–29)
CREAT SERPL-MCNC: 0.6 MG/DL (ref 0.5–1.4)
EAG (OHS): 338 MG/DL (ref 68–131)
GFR SERPLBLD CREATININE-BSD FMLA CKD-EPI: >60 ML/MIN/1.73/M2
GLUCOSE SERPL-MCNC: 191 MG/DL (ref 70–110)
HBA1C MFR BLD: 13.4 % (ref 4–5.6)
HDLC SERPL-MCNC: 36 MG/DL (ref 40–75)
HDLC SERPL: 13.4 % (ref 20–50)
LDLC SERPL CALC-MCNC: 207.2 MG/DL (ref 63–159)
NONHDLC SERPL-MCNC: 233 MG/DL
POTASSIUM SERPL-SCNC: 4 MMOL/L (ref 3.5–5.1)
PROT SERPL-MCNC: 7.9 GM/DL (ref 6–8.4)
SODIUM SERPL-SCNC: 137 MMOL/L (ref 136–145)
TRIGL SERPL-MCNC: 129 MG/DL (ref 30–150)

## 2025-06-12 PROCEDURE — 80061 LIPID PANEL: CPT

## 2025-06-12 PROCEDURE — 99999 PR PBB SHADOW E&M-EST. PATIENT-LVL III: CPT | Mod: PBBFAC,,, | Performed by: INTERNAL MEDICINE

## 2025-06-12 PROCEDURE — 83036 HEMOGLOBIN GLYCOSYLATED A1C: CPT

## 2025-06-12 PROCEDURE — 80053 COMPREHEN METABOLIC PANEL: CPT

## 2025-06-12 PROCEDURE — 36415 COLL VENOUS BLD VENIPUNCTURE: CPT | Mod: PN

## 2025-06-12 RX ORDER — DAPAGLIFLOZIN AND METFORMIN HYDROCHLORIDE 5; 1000 MG/1; MG/1
2 TABLET, FILM COATED, EXTENDED RELEASE ORAL EVERY MORNING
Qty: 60 TABLET | Refills: 2 | Status: SHIPPED | OUTPATIENT
Start: 2025-06-12

## 2025-06-12 RX ORDER — BLOOD-GLUCOSE SENSOR
1 EACH MISCELLANEOUS
Qty: 3 EACH | Refills: 12 | Status: SHIPPED | OUTPATIENT
Start: 2025-06-12 | End: 2026-06-12

## 2025-06-12 RX ORDER — ROSUVASTATIN CALCIUM 5 MG/1
5 TABLET, COATED ORAL DAILY
Qty: 30 TABLET | Refills: 3 | Status: SHIPPED | OUTPATIENT
Start: 2025-06-12 | End: 2026-06-12

## 2025-06-12 RX ORDER — SERTRALINE HYDROCHLORIDE 25 MG/1
25 TABLET, FILM COATED ORAL DAILY
Qty: 30 TABLET | Refills: 1 | Status: SHIPPED | OUTPATIENT
Start: 2025-06-12 | End: 2026-06-12

## 2025-06-12 RX ORDER — HYDROXYZINE HYDROCHLORIDE 25 MG/1
25 TABLET, FILM COATED ORAL 3 TIMES DAILY PRN
Qty: 60 TABLET | Refills: 0 | Status: SHIPPED | OUTPATIENT
Start: 2025-06-12

## 2025-06-12 RX ORDER — BLOOD-GLUCOSE SENSOR
1 EACH MISCELLANEOUS
Qty: 3 EACH | Refills: 12 | Status: SHIPPED | OUTPATIENT
Start: 2025-06-12 | End: 2025-06-12

## 2025-06-12 RX ORDER — SEMAGLUTIDE 0.68 MG/ML
INJECTION, SOLUTION SUBCUTANEOUS
Qty: 3 ML | Refills: 2 | Status: SHIPPED | OUTPATIENT
Start: 2025-06-12

## 2025-06-12 RX ORDER — INSULIN GLARGINE 300 [IU]/ML
10 INJECTION, SOLUTION SUBCUTANEOUS DAILY
Qty: 1.5 ML | Refills: 1 | Status: SHIPPED | OUTPATIENT
Start: 2025-06-12 | End: 2026-06-12

## 2025-06-12 RX ORDER — CLINDAMYCIN PHOSPHATE 10 MG/G
GEL TOPICAL 2 TIMES DAILY
Qty: 30 G | Refills: 0 | Status: SHIPPED | OUTPATIENT
Start: 2025-06-12

## 2025-06-12 RX ORDER — HYDROXYZINE HYDROCHLORIDE 25 MG/1
25 TABLET, FILM COATED ORAL 3 TIMES DAILY
COMMUNITY
End: 2025-06-12 | Stop reason: SDUPTHER

## 2025-06-12 SDOH — SOCIAL DETERMINANTS OF HEALTH (SDOH): DISSAPEARANCE AND DEATH OF FAMILY MEMBER: Z63.4

## 2025-06-12 NOTE — PROGRESS NOTES
Subjective:       Patient ID: Shankar Barboza is a 29 y.o. female.    Chief Complaint: Follow-up (Patient is here for a follow up. She wants to discuss her face breaking out for the past 2 weeks. She has used benadryl and cortisone but it didn't help. )   HPI     History of Present Illness            Gyn: Dr Foreman   Immunizations: Flu: Tdap: booster recommend.  Prevnar 20: next visit   Smoker: never   Previous noncompliance with medication use and lab follow-up    Working at [employer] now.    Here w mother     Since last visit has been seen in ER for panic attack.  At that time glucose was 600.   Father passed away in March pancreatic cancer, then in April got mother passed away unexpectedly.  Has had anxiety since episode.  ER gave her hydroxyzine.  She also got a puppy that helps w her anxiety emotional support    Has been taking her metformin Jardiance were regularly recently  Never took the insulin due to cost with old insurance   Will like to get glucose sensor gave samples of freestyle Page 3+    Would also like to restart Ozempic   Occasionally drank sweet tea not every day       Panic attack seen in ER -since then face breaking out ? Stress  Rx prn hydroxyzine.  Has never taken SSRI   Lost father march cancer and GM April     history of iron-deficiency history of getting IV infusions in the past management Hematology       Type 2 diabetes:  Uncontrolled last A1c 13 Rx Metformin  bid, Jardiance 25.   Diagnosed early 20's.    Highest 13      Mixed HLD/low HDL: LDL goal < 70 // not taking statin               LDL highest 181     Factor 7 deficiency:  Previous severe menorrhagia.  Manage OCP.   Mgmt  Heme Dr [facility]  Avoids NSAIDs.  Developed allergy  Tylenol.     Iron deficiency:  No anemia.  Iron sat 14.  Recommend Rx OTC MVI with iron daily     Metabolic syndrome: BMI 43/274, 42/254, 41/242        ____________________________________________________________________________________________________  Assessment  & Plan:  1. DM type 2 with diabetic mixed hyperlipidemia  - Comprehensive Metabolic Panel; Future  - Hemoglobin A1C; Future  - Lipid Panel; Future  - insulin glargine, TOUJEO, (TOUJEO SOLOSTAR U-300 INSULIN) 300 unit/mL (1.5 mL) InPn pen; Inject 10 Units into the skin once daily.  Dispense: 1.5 mL; Refill: 1  - semaglutide (OZEMPIC) 0.25 mg or 0.5 mg (2 mg/3 mL) pen injector; 0.25 mg Q 7 days x 1 m then 0.5 mg Q 7 day  Dispense: 3 mL; Refill: 2  - blood-glucose sensor (FREESTYLE ALAN 3 PLUS SENSOR) Annette; 1 Device by Misc.(Non-Drug; Combo Route) route every 14 (fourteen) days.  Dispense: 3 each; Refill: 12  - dapaglifloz propaned-metformin (XIGDUO XR) 5-1,000 mg; Take 2 tablets by mouth every morning.  Dispense: 60 tablet; Refill: 2  - rosuvastatin (CRESTOR) 5 MG tablet; Take 1 tablet (5 mg total) by mouth once daily.  Dispense: 30 tablet; Refill: 3    2. Reaction, adjustment, with anxious, depressed mood  - sertraline (ZOLOFT) 25 MG tablet; Take 1 tablet (25 mg total) by mouth once daily.  Dispense: 30 tablet; Refill: 1    3. Acne, unspecified acne type  - clindamycin phosphate 1% 1 % gel; Apply topically 2 (two) times daily.  Dispense: 30 g; Refill: 0    4. Bereavement reaction    5. Wellness examination  - Comprehensive Metabolic Panel; Future  - Hemoglobin A1C; Future  - Lipid Panel; Future     DM type 2 with diabetic mixed hyperlipidemia  Comments:  Change metformin Jardiance to combo Xigduo  Orders:  -     Comprehensive Metabolic Panel; Future; Expected date: 06/12/2025  -     Hemoglobin A1C; Future; Expected date: 06/12/2025  -     Lipid Panel; Future; Expected date: 06/12/2025  -     insulin glargine, TOUJEO, (TOUJEO SOLOSTAR U-300 INSULIN) 300 unit/mL (1.5 mL) InPn pen; Inject 10 Units into the skin once daily.  Dispense: 1.5 mL; Refill: 1  -     semaglutide (OZEMPIC) 0.25 mg or 0.5 mg (2 mg/3 mL) pen injector; 0.25 mg Q 7 days x 1 m then 0.5 mg Q 7 day  Dispense: 3 mL; Refill: 2  -     Discontinue:  blood-glucose sensor (FREESTYLE ALAN 3 SENSOR) Annette; 1 Device by Misc.(Non-Drug; Combo Route) route every 10 days.  Dispense: 3 each; Refill: 12  -     blood-glucose sensor (FREESTYLE ALAN 3 PLUS SENSOR) Annette; 1 Device by Misc.(Non-Drug; Combo Route) route every 14 (fourteen) days.  Dispense: 3 each; Refill: 12  -     dapaglifloz propaned-metformin (XIGDUO XR) 5-1,000 mg; Take 2 tablets by mouth every morning.  Dispense: 60 tablet; Refill: 2  -     rosuvastatin (CRESTOR) 5 MG tablet; Take 1 tablet (5 mg total) by mouth once daily.  Dispense: 30 tablet; Refill: 3    Reaction, adjustment, with anxious, depressed mood  -     sertraline (ZOLOFT) 25 MG tablet; Take 1 tablet (25 mg total) by mouth once daily.  Dispense: 30 tablet; Refill: 1    Acne, unspecified acne type  -     clindamycin phosphate 1% 1 % gel; Apply topically 2 (two) times daily.  Dispense: 30 g; Refill: 0    Bereavement reaction    Wellness examination  -     Comprehensive Metabolic Panel; Future; Expected date: 06/12/2025  -     Hemoglobin A1C; Future; Expected date: 06/12/2025  -     Lipid Panel; Future; Expected date: 06/12/2025        Continue to work on maintain healthy weight, balanced diet. Avoid unhealthy habits: smoking/vaping, excessive alcohol intake.     Recommend diet exercise:  High protein, low fat, low carb diet - calories women under <1200 & men <1800, carbs <100 G, protein 50-60 G daily. Protein supplements to replace one meal.  Keep all beverages < 10 calories per serving. Keep snacks < 100 calories.   Recommend exercise 160 minutes per week, combo of cardio and weight/strength training.     Visit today included increased complexity associated with the care of the episodic problem addressed and managing the longitudinal care of the patient due to the serious and/or complex managed problem(s). HTN      Disclaimer: This note was partly generated using dictation software which may occasionally result in transcription  errors  ____________________________________________________________________________________________________  Review of Systems:  Review of Systems   Constitutional:  Positive for fatigue. Negative for appetite change.   HENT:  Negative for nosebleeds.    Eyes:  Negative for pain.   Respiratory:  Negative for choking.    Cardiovascular:  Negative for chest pain.   Gastrointestinal:  Negative for blood in stool.   Genitourinary:  Negative for hematuria.   Musculoskeletal:  Negative for joint swelling.   Skin:  Negative for pallor.   Neurological:  Negative for facial asymmetry.   Hematological:  Does not bruise/bleed easily.   Psychiatric/Behavioral:  Positive for dysphoric mood. Negative for confusion. The patient is nervous/anxious.              Objective:     Wt Readings from Last 3 Encounters:   06/12/25 109.9 kg (242 lb 4.6 oz)   06/05/25 112 kg (246 lb 14.6 oz)   06/05/25 112.1 kg (247 lb 2.2 oz)     BP Readings from Last 3 Encounters:   06/12/25 118/82   06/05/25 124/82   06/05/25 124/81       Lab Results   Component Value Date    WBC 5.60 04/21/2025    HGB 12.7 04/21/2025    HCT 39.4 04/21/2025     04/21/2025     (L) 04/01/2025    K 4.5 04/01/2025    CL 99 02/05/2025    ALT 16 04/01/2025    AST 13 04/01/2025    CO2 23 04/01/2025    CREATININE 1.03 04/01/2025    BUN 11 04/01/2025     (H) 02/05/2025      Hemoglobin A1C   Date Value Ref Range Status   02/05/2025 13.3 (H) 4.0 - 5.6 % Final     Comment:     ADA Screening Guidelines:  5.7-6.4%  Consistent with prediabetes  >or=6.5%  Consistent with diabetes    High levels of fetal hemoglobin interfere with the HbA1C  assay. Heterozygous hemoglobin variants (HbS, HgC, etc)do  not significantly interfere with this assay.   However, presence of multiple variants may affect accuracy.     08/05/2024 10.5 (H) 4.0 - 5.6 % Final     Comment:     ADA Screening Guidelines:  5.7-6.4%  Consistent with prediabetes  >or=6.5%  Consistent with diabetes    High  "levels of fetal hemoglobin interfere with the HbA1C  assay. Heterozygous hemoglobin variants (HbS, HgC, etc)do  not significantly interfere with this assay.   However, presence of multiple variants may affect accuracy.        Lab Results   Component Value Date    TSH 1.823 08/05/2024     No results found for: "FREET4"  Lab Results   Component Value Date    LDLCALC 181.2 (H) 02/05/2025    LDLCALC 164.6 (H) 08/05/2024    LDLCALC 178 (H) 12/05/2023     Lab Results   Component Value Date    TRIG 164 (H) 02/05/2025    TRIG 122 08/05/2024    TRIG 102 12/05/2023            Physical Exam  Constitutional:       Appearance: Normal appearance.      Comments: Here w mother tearful  face acne    HENT:      Head: Normocephalic and atraumatic.   Eyes:      Extraocular Movements: Extraocular movements intact.      Pupils: Pupils are equal, round, and reactive to light.   Cardiovascular:      Rate and Rhythm: Normal rate.   Pulmonary:      Effort: Pulmonary effort is normal.   Neurological:      Mental Status: She is alert and oriented to person, place, and time.   Psychiatric:         Mood and Affect: Mood normal. Affect is tearful.               Medication List with Changes/Refills   New Medications    BLOOD-GLUCOSE SENSOR (FREESTYLE ALAN 3 PLUS SENSOR) KRYSTIAN    1 Device by Misc.(Non-Drug; Combo Route) route every 14 (fourteen) days.    CLINDAMYCIN PHOSPHATE 1% 1 % GEL    Apply topically 2 (two) times daily.    DAPAGLIFLOZ PROPANED-METFORMIN (XIGDUO XR) 5-1,000 MG    Take 2 tablets by mouth every morning.    INSULIN GLARGINE, TOUJEO, (TOUJEO SOLOSTAR U-300 INSULIN) 300 UNIT/ML (1.5 ML) INPN PEN    Inject 10 Units into the skin once daily.    SEMAGLUTIDE (OZEMPIC) 0.25 MG OR 0.5 MG (2 MG/3 ML) PEN INJECTOR    0.25 mg Q 7 days x 1 m then 0.5 mg Q 7 day    SERTRALINE (ZOLOFT) 25 MG TABLET    Take 1 tablet (25 mg total) by mouth once daily.   Current Medications    COAGULATION FACTOR VIIA,RECOMB (NOVOSEVEN IV)    Inject into the vein. " "   HYDROXYZINE HCL (ATARAX) 25 MG TABLET    Take 25 mg by mouth 3 (three) times daily.    LANCETS MISC    To check BG daily , to use with insurance preferred meter    PEN NEEDLE, DIABETIC (BD ULTRA-FINE JOHNNY PEN NEEDLE) 32 GAUGE X 5/32" NDLE    1 applicator by Misc.(Non-Drug; Combo Route) route once daily.   Changed and/or Refilled Medications    Modified Medication Previous Medication    ROSUVASTATIN (CRESTOR) 5 MG TABLET rosuvastatin (CRESTOR) 5 MG tablet       Take 1 tablet (5 mg total) by mouth once daily.    Take 1 tablet (5 mg total) by mouth once daily.   Discontinued Medications    BLOOD SUGAR DIAGNOSTIC STRP    To check BG daily , to use with insurance preferred meter    BLOOD-GLUCOSE METER KIT    To check BG 2 times daily, to use with insurance preferred meter    EMPAGLIFLOZIN (JARDIANCE) 25 MG TABLET    Take 1 tablet (25 mg total) by mouth once daily.    INSULIN GLARGINE-LIXISENATIDE (SOLIQUA 100/33) 100 UNIT-33 MCG/ML INPN PEN    Inject 10 Units into the skin daily with breakfast.    METFORMIN (GLUCOPHAGE-XR) 500 MG ER 24HR TABLET    Take 1 tablet (500 mg total) by mouth 2 (two) times daily with meals.       "

## 2025-06-12 NOTE — TELEPHONE ENCOUNTER
Copied from CRM #[id number]. Topic: Medications - Pharmacy  >> Jun 12, 2025 12:03 PM Rasheeda wrote:  Type: Needs Medical Advice  Who Called:  [facility]    Pharmacy name and phone #:    [facility] [id number]  [place] - [address]  [address] [address]  Phone: [phone] Fax: [phone]      Best Call Back Number: [phone]  Additional Information:They are needing to get clarifcation of freestyle michael, please call to rodney

## 2025-06-12 NOTE — TELEPHONE ENCOUNTER
Pharmacy wanted to call and confirm which freestyle michael is needed for the patient. Pcp gave verbal on whichever one was covered but she gave her a freestyle michael 3 plus and to be changed every 15 days.

## 2025-06-17 ENCOUNTER — RESULTS FOLLOW-UP (OUTPATIENT)
Dept: FAMILY MEDICINE | Facility: CLINIC | Age: 30
End: 2025-06-17
Payer: COMMERCIAL

## 2025-06-17 DIAGNOSIS — Z00.00 WELLNESS EXAMINATION: ICD-10-CM

## 2025-06-17 DIAGNOSIS — E78.2 DM TYPE 2 WITH DIABETIC MIXED HYPERLIPIDEMIA: Primary | ICD-10-CM

## 2025-06-17 DIAGNOSIS — E11.69 DM TYPE 2 WITH DIABETIC MIXED HYPERLIPIDEMIA: Primary | ICD-10-CM

## 2025-07-14 ENCOUNTER — PATIENT MESSAGE (OUTPATIENT)
Dept: ADMINISTRATIVE | Facility: HOSPITAL | Age: 30
End: 2025-07-14
Payer: COMMERCIAL